# Patient Record
Sex: FEMALE | Race: WHITE | NOT HISPANIC OR LATINO | Employment: FULL TIME | URBAN - METROPOLITAN AREA
[De-identification: names, ages, dates, MRNs, and addresses within clinical notes are randomized per-mention and may not be internally consistent; named-entity substitution may affect disease eponyms.]

---

## 2017-03-28 ENCOUNTER — PATIENT MESSAGE (OUTPATIENT)
Dept: FAMILY MEDICINE | Facility: CLINIC | Age: 37
End: 2017-03-28

## 2017-04-21 ENCOUNTER — OFFICE VISIT (OUTPATIENT)
Dept: UROLOGY | Facility: CLINIC | Age: 37
End: 2017-04-21
Payer: MEDICAID

## 2017-04-21 VITALS
HEIGHT: 72 IN | DIASTOLIC BLOOD PRESSURE: 70 MMHG | BODY MASS INDEX: 25.53 KG/M2 | SYSTOLIC BLOOD PRESSURE: 116 MMHG | HEART RATE: 104 BPM | WEIGHT: 188.5 LBS

## 2017-04-21 DIAGNOSIS — N39.0 RECURRENT UTI: ICD-10-CM

## 2017-04-21 DIAGNOSIS — R31.9 HEMATURIA: Primary | ICD-10-CM

## 2017-04-21 LAB
BILIRUB SERPL-MCNC: ABNORMAL MG/DL
BLOOD URINE, POC: ABNORMAL
COLOR, POC UA: YELLOW
GLUCOSE UR QL STRIP: ABNORMAL
KETONES UR QL STRIP: ABNORMAL
LEUKOCYTE ESTERASE URINE, POC: ABNORMAL
NITRITE, POC UA: ABNORMAL
PH, POC UA: 5
PROTEIN, POC: ABNORMAL
SPECIFIC GRAVITY, POC UA: 1.01
UROBILINOGEN, POC UA: ABNORMAL

## 2017-04-21 PROCEDURE — 99204 OFFICE O/P NEW MOD 45 MIN: CPT | Mod: S$PBB,,, | Performed by: UROLOGY

## 2017-04-21 PROCEDURE — 99213 OFFICE O/P EST LOW 20 MIN: CPT | Mod: PBBFAC,PO | Performed by: UROLOGY

## 2017-04-21 PROCEDURE — 81002 URINALYSIS NONAUTO W/O SCOPE: CPT | Mod: PBBFAC,PO | Performed by: UROLOGY

## 2017-04-21 PROCEDURE — 99999 PR PBB SHADOW E&M-EST. PATIENT-LVL III: CPT | Mod: PBBFAC,,, | Performed by: UROLOGY

## 2017-04-21 NOTE — PROGRESS NOTES
A 37-year-old female accompanied by her  complaining of recurrent UTIs   for the last two years.  She had them during her early infancy and then they   tended to be less frequent and it got worse during the teenage years.  After   having her children in 2006 and 2008, she got better for a while and now finds   that they are coming back.  She tends to have approximately three infections per   year.  When she has a UTI, her symptoms are urinary frequency, urgency and   burning feeling at the end of the voiding.  She has no fever with her UTIs and   has no renal back pain.  She uses usually AZO and antibiotics are often Cipro or   Bactrim.  She does well with the antibiotic, but then tends to recur.  She was   recently in the ER at [facility], had bloody urine when catheterized and   it was questionable whether she had just a UTI or actually a stone.  Her [facility]   lab history has one urine culture from a year ago, which was no growth.    PAST MEDICAL HISTORY:  SURGICAL:  Vein ligation and stripping in lower extremities.  MEDICAL:  Varicose veins, gout, depression and anxiety.  FAMILIAL:  Father had hypertension.  Mother had osteoporosis and recurrent UTIs.    The patient has a brother with nephrolithiasis.  SOCIAL:  The patient is a , , lives in [place].    ALLERGIES:  Hydrocodone, which caused her itching.    PHYSICAL EXAMINATION:  ABDOMEN:  Flat.  No masses.  CVA is negative.  No organomegaly or tenderness.    IMPRESSION:  Recurrent UTIs, history of early UTIs in life.  The patient says   she was told she might have interstitial cystitis.    I recommend cystoscopy, bladder scan her, urine cultures as needed, image   evaluation of the upper tracts to study her UTI recurrence.  Eventually if she   is more convincingly thought of having interstitial cystitis, might have   hydrodistention of the bladder under anesthesia.  She can also be on antibiotic   prophylaxis if needed and  eventually could be considered for interstitial   cystitis medical treatment if appropriate.      ERR/TN  dd: 04/24/2017 18:44:33 (CDT)  td: 04/25/2017 13:19:25 (CDT)  Doc ID   #[id number]  Job ID #[id number]    CC:

## 2017-05-09 ENCOUNTER — PROCEDURE VISIT (OUTPATIENT)
Dept: UROLOGY | Facility: CLINIC | Age: 37
End: 2017-05-09
Payer: MEDICAID

## 2017-05-09 VITALS
DIASTOLIC BLOOD PRESSURE: 84 MMHG | SYSTOLIC BLOOD PRESSURE: 115 MMHG | WEIGHT: 187.38 LBS | HEIGHT: 72 IN | BODY MASS INDEX: 25.38 KG/M2 | HEART RATE: 58 BPM

## 2017-05-09 DIAGNOSIS — R31.9 HEMATURIA: ICD-10-CM

## 2017-05-09 DIAGNOSIS — N30.10 IC (INTERSTITIAL CYSTITIS): Primary | ICD-10-CM

## 2017-05-09 DIAGNOSIS — N30.00 ACUTE CYSTITIS WITHOUT HEMATURIA: ICD-10-CM

## 2017-05-09 LAB
BILIRUB SERPL-MCNC: ABNORMAL MG/DL
BLOOD URINE, POC: ABNORMAL
COLOR, POC UA: YELLOW
GLUCOSE UR QL STRIP: ABNORMAL
KETONES UR QL STRIP: ABNORMAL
LEUKOCYTE ESTERASE URINE, POC: ABNORMAL
NITRITE, POC UA: ABNORMAL
PH, POC UA: 5
PROTEIN, POC: ABNORMAL
SPECIFIC GRAVITY, POC UA: 1
UROBILINOGEN, POC UA: ABNORMAL

## 2017-05-09 PROCEDURE — 52000 CYSTOURETHROSCOPY: CPT | Mod: S$PBB,,, | Performed by: UROLOGY

## 2017-05-09 PROCEDURE — 81002 URINALYSIS NONAUTO W/O SCOPE: CPT | Mod: PBBFAC,PO | Performed by: UROLOGY

## 2017-05-09 PROCEDURE — 52000 CYSTOURETHROSCOPY: CPT | Mod: PBBFAC,PO | Performed by: UROLOGY

## 2017-05-09 RX ORDER — SULFAMETHOXAZOLE AND TRIMETHOPRIM 800; 160 MG/1; MG/1
1 TABLET ORAL 2 TIMES DAILY
Qty: 30 TABLET | Refills: 3 | Status: SHIPPED | OUTPATIENT
Start: 2017-05-09 | End: 2017-06-08

## 2017-05-09 NOTE — PROGRESS NOTES
UROLOGY [place]  5 9 17    c-c recurrent uti's; bladder pain    Age 37, here for cystoscopy.    CYSTOSCOPY [facility] flexible. Urethra normal, with no stricture or diverticulum. Bladder cavity distends symmetrically and equally when distended with water. Mucosal layer with no lesions. No petechiae seen on the bladder wall. No abnormal trabeculation. Location and shape of the ureteral orifices normal. Pt tolerated the procedure well.     IMP  Recurrent uti's  Chronic bladder irritation    Pt has a hx of recurrent uti's. When questioned regarding possible relation with sexual intercourse, pt does not thing that there is any definite connection between intercourse and subsequent uti's  We discussed hygienic measures.   Pt has what appear to be recurrent uti's; we discussed possible use of preventive antibiotics, and we discussed various ways to use them for that purpose.  We decided to put pt on a patient-initiating program of episodic antibiotic use, where pt would take bactrim bid x 3 days at the first sign of a uti.  She would then let us know if the approach is working for her.    In addition to the recurrent uti's, pt appears to have a chronic state of bladder irritation.  The features of her bladder problems places her in the category of interstitial cystitis (low volume voidings, chronic bladder pain, partial and temporary relief of the bladder pain after voiding).  I discussed approaches to this chronic condition, and I suggest treatment triad with elmiron 100 mg po bid, the antihistaminic atarax 50 bid, and the antidepressant elavil 50 qhs.   For now, I am starting with the elmiron only and will later add the other two as needed.  Can also start using prelief before every meal.  Can also use pyridium 20 tid with a meal as needed for dysuria  Can also use uribel as needed.  Eventually we can use vaginal suppositories with lidocaine 20 mg, baclofen 6 mg and diazepam 5 mg (compounded)  RTC 6 mo

## 2017-05-10 ENCOUNTER — PATIENT MESSAGE (OUTPATIENT)
Dept: UROLOGY | Facility: CLINIC | Age: 37
End: 2017-05-10

## 2017-05-11 ENCOUNTER — PATIENT MESSAGE (OUTPATIENT)
Dept: UROLOGY | Facility: CLINIC | Age: 37
End: 2017-05-11

## 2017-06-09 ENCOUNTER — OFFICE VISIT (OUTPATIENT)
Dept: FAMILY MEDICINE | Facility: CLINIC | Age: 37
End: 2017-06-09
Payer: MEDICAID

## 2017-06-09 VITALS
DIASTOLIC BLOOD PRESSURE: 88 MMHG | TEMPERATURE: 99 F | HEIGHT: 72 IN | BODY MASS INDEX: 23.89 KG/M2 | OXYGEN SATURATION: 97 % | HEART RATE: 92 BPM | RESPIRATION RATE: 18 BRPM | SYSTOLIC BLOOD PRESSURE: 118 MMHG | WEIGHT: 176.38 LBS

## 2017-06-09 DIAGNOSIS — R61 NIGHT SWEATS: ICD-10-CM

## 2017-06-09 DIAGNOSIS — F32.A DEPRESSION, UNSPECIFIED DEPRESSION TYPE: Primary | ICD-10-CM

## 2017-06-09 PROCEDURE — 99214 OFFICE O/P EST MOD 30 MIN: CPT | Mod: S$GLB,,, | Performed by: NURSE PRACTITIONER

## 2017-06-09 RX ORDER — FLUOXETINE HYDROCHLORIDE 20 MG/1
20 CAPSULE ORAL DAILY
Qty: 30 CAPSULE | Refills: 2 | Status: SHIPPED | OUTPATIENT
Start: 2017-06-09 | End: 2017-09-19 | Stop reason: SDUPTHER

## 2017-06-09 RX ORDER — ALPRAZOLAM 0.5 MG/1
0.5 TABLET ORAL 3 TIMES DAILY
COMMUNITY
End: 2017-06-14 | Stop reason: SDUPTHER

## 2017-06-09 RX ORDER — FERROUS GLUCONATE 324(38)MG
324 TABLET ORAL
COMMUNITY
End: 2017-09-29

## 2017-06-09 NOTE — PROGRESS NOTES
Subjective:       Patient ID: Nayeli Gore is a 37 y.o. female.    Chief Complaint: Fatigue x 2-3 months, check labs and Medication Management, discuss antidepressant    HPI Answers for HPI/ROS submitted by the patient on 6/7/2017   activity change: No  unexpected weight change: No  neck pain: Yes  hearing loss: No  rhinorrhea: No  trouble swallowing: No  eye discharge: No  visual disturbance: No  chest tightness: No  wheezing: No  chest pain: No  palpitations: Yes  blood in stool: No  constipation: No  vomiting: No  diarrhea: Yes  polydipsia: No  polyuria: Yes  difficulty urinating: No  hematuria: Yes  menstrual problem: No  dysuria: Yes  joint swelling: No  arthralgias: No  headaches: Yes  weakness: No  confusion: No  dysphoric mood: Yes    She is here with concerns regarding more symptoms of depression, night sweats. Not sleeping well. She feels like this could be hormone related. She would like to have blood drawn to check her thyroid and hormones. She denies any thoughts of wanting to harm herself or others. She feels at times that her heart is racing. She feels that the wellbutrin helps a little, but is not working as well as it needs too. See ROS.    The following portion of the patients history was reviewed and updated as appropriate: allergies, current medications, past medical and surgical history. Past social history and problem list reviewed. Family PMH and Past social history reviewed. Tobacco, Illicit drug use reviewed.     Review of Systems   Constitutional: Positive for fatigue. Negative for activity change, chills, fever and unexpected weight change.        Night sweats.    HENT: Negative for hearing loss, rhinorrhea and trouble swallowing.    Eyes: Negative for discharge and visual disturbance.   Respiratory: Negative for cough, chest tightness and wheezing.    Cardiovascular: Positive for palpitations. Negative for chest pain.   Gastrointestinal: Positive for diarrhea. Negative for blood  "in stool, constipation and vomiting.   Endocrine: Positive for polyuria. Negative for polydipsia.   Genitourinary: Positive for dysuria and hematuria. Negative for difficulty urinating and menstrual problem.   Musculoskeletal: Positive for neck pain. Negative for arthralgias and joint swelling.   Skin: Negative for rash.   Neurological: Positive for headaches. Negative for weakness.   Psychiatric/Behavioral: Positive for dysphoric mood. Negative for confusion.       Objective:     /88 (BP Location: Left arm, Patient Position: Sitting, BP Method: Manual)   Pulse 92   Temp 98.8 °F (37.1 °C) (Oral)   Resp 18   Ht 6' 2" (1.88 m)   Wt 80 kg (176 lb 5.9 oz)   SpO2 97%   BMI 22.64 kg/m²      Physical Exam     Constitutional: oriented to person, place, and time. well-developed and well-nourished.   HENT: normal  Head: Normocephalic.   Eyes: Conjunctivae are normal. Pupils are equal, round, and reactive to light.   Neck: Normal range of motion. Neck supple. No tracheal deviation present. No thyromegaly present.   Cardiovascular: Normal rate, regular rhythm and normal heart sounds.    Pulmonary/Chest: Effort normal and breath sounds normal. No respiratory distress. No wheezes.   Abdominal: Soft. Bowel sounds are normal. No distension. There is no tenderness.   Musculoskeletal: Normal range of motion. No edema.   Neurological: oriented to person, place, and time.   Skin: Skin is warm and dry. No rashes or lesions  Psychiatric: Normal mood and affect.Behavior is normal. Judgment and thought content normal. she does not present as a threat to herself or others  Assessment:       1. Depression, unspecified depression type    2. Night sweats        Plan:         Nayeli was seen today for fatigue x 2-3 months, check labs and medication management, discuss antidepressant.    Diagnoses and all orders for this visit:    Depression, unspecified depression type: will add prozac to her medications.   -     Estradiol  -     " Follicle stimulating hormone; Future  -     Progesterone  -     Testosterone; Future  -     TSH; Future    Night sweats:  Will check hormone levels.   -     Estradiol  -     Follicle stimulating hormone; Future  -     Progesterone  -     Testosterone; Future  -     TSH; Future    Other orders  -     fluoxetine (PROZAC) 20 MG capsule; Take 1 capsule (20 mg total) by mouth once daily.    Continue current medication  Take medications only as prescribed  Healthy diet, exercise  Adequate rest  Adequate hydration  Avoid allergens  Avoid excessive caffeine

## 2017-06-09 NOTE — PROGRESS NOTES
Answers for HPI/ROS submitted by the patient on 6/7/2017   activity change: No  unexpected weight change: No  neck pain: Yes  hearing loss: No  rhinorrhea: No  trouble swallowing: No  eye discharge: No  visual disturbance: No  chest tightness: No  wheezing: No  chest pain: No  palpitations: Yes  blood in stool: No  constipation: No  vomiting: No  diarrhea: Yes  polydipsia: No  polyuria: Yes  difficulty urinating: No  hematuria: Yes  menstrual problem: No  dysuria: Yes  joint swelling: No  arthralgias: No  headaches: Yes  weakness: No  confusion: No  dysphoric mood: Yes

## 2017-06-10 ENCOUNTER — LAB VISIT (OUTPATIENT)
Dept: LAB | Facility: HOSPITAL | Age: 37
End: 2017-06-10
Attending: NURSE PRACTITIONER
Payer: MEDICAID

## 2017-06-10 DIAGNOSIS — R61 NIGHT SWEATS: ICD-10-CM

## 2017-06-10 DIAGNOSIS — F32.A DEPRESSION, UNSPECIFIED DEPRESSION TYPE: ICD-10-CM

## 2017-06-10 LAB
FSH SERPL-ACNC: 3 MIU/ML
TESTOST SERPL-MCNC: 44 NG/DL
TSH SERPL DL<=0.005 MIU/L-ACNC: 0.73 UIU/ML

## 2017-06-10 PROCEDURE — 83001 ASSAY OF GONADOTROPIN (FSH): CPT

## 2017-06-10 PROCEDURE — 84403 ASSAY OF TOTAL TESTOSTERONE: CPT

## 2017-06-10 PROCEDURE — 36415 COLL VENOUS BLD VENIPUNCTURE: CPT | Mod: PO

## 2017-06-10 PROCEDURE — 84443 ASSAY THYROID STIM HORMONE: CPT

## 2017-06-11 ENCOUNTER — PATIENT MESSAGE (OUTPATIENT)
Dept: FAMILY MEDICINE | Facility: CLINIC | Age: 37
End: 2017-06-11

## 2017-06-11 ENCOUNTER — TELEPHONE (OUTPATIENT)
Dept: FAMILY MEDICINE | Facility: CLINIC | Age: 37
End: 2017-06-11

## 2017-06-11 DIAGNOSIS — R53.83 FATIGUE, UNSPECIFIED TYPE: Primary | ICD-10-CM

## 2017-06-11 NOTE — TELEPHONE ENCOUNTER
She was suppose to have estradiol and progesterone drawn with the labs we did in clinic on Friday. Shows they were not collected.  Why?  Let her know that these two were over looked and she needs to have them done. What was done the FSH, TSH and testosterone are all in normal range.

## 2017-06-13 ENCOUNTER — PATIENT MESSAGE (OUTPATIENT)
Dept: FAMILY MEDICINE | Facility: CLINIC | Age: 37
End: 2017-06-13

## 2017-06-14 RX ORDER — ALPRAZOLAM 0.5 MG/1
0.5 TABLET ORAL 3 TIMES DAILY PRN
Qty: 30 TABLET | Refills: 0 | Status: SHIPPED | OUTPATIENT
Start: 2017-06-14 | End: 2017-07-07 | Stop reason: SDUPTHER

## 2017-06-14 NOTE — TELEPHONE ENCOUNTER
Reviewed patients chart- it looks like the two that were not processed were entered as clinic collect and the rest as lab collect. Pt went to the lab to have labs drawn. Unsure if this was preplanned? I do not see that any orders were cancelled and reentered. She is okay repeating labs and asks if her iron level can be checked also. Orders pended.

## 2017-06-17 ENCOUNTER — LAB VISIT (OUTPATIENT)
Dept: LAB | Facility: HOSPITAL | Age: 37
End: 2017-06-17
Attending: NURSE PRACTITIONER
Payer: MEDICAID

## 2017-06-17 DIAGNOSIS — R53.83 FATIGUE, UNSPECIFIED TYPE: ICD-10-CM

## 2017-06-17 LAB
ESTRADIOL SERPL-MCNC: 113 PG/ML
FERRITIN SERPL-MCNC: 7 NG/ML
IRON SERPL-MCNC: 32 UG/DL
PROGEST SERPL-MCNC: 10 NG/ML
SATURATED IRON: 8 %
TOTAL IRON BINDING CAPACITY: 425 UG/DL
TRANSFERRIN SERPL-MCNC: 287 MG/DL

## 2017-06-17 PROCEDURE — 82728 ASSAY OF FERRITIN: CPT

## 2017-06-17 PROCEDURE — 36415 COLL VENOUS BLD VENIPUNCTURE: CPT | Mod: PO

## 2017-06-17 PROCEDURE — 83540 ASSAY OF IRON: CPT

## 2017-06-17 PROCEDURE — 82670 ASSAY OF TOTAL ESTRADIOL: CPT

## 2017-06-17 PROCEDURE — 84144 ASSAY OF PROGESTERONE: CPT

## 2017-06-19 ENCOUNTER — TELEPHONE (OUTPATIENT)
Dept: FAMILY MEDICINE | Facility: CLINIC | Age: 37
End: 2017-06-19

## 2017-06-19 DIAGNOSIS — D50.9 IRON DEFICIENCY ANEMIA, UNSPECIFIED: Primary | ICD-10-CM

## 2017-06-19 NOTE — TELEPHONE ENCOUNTER
Notified pt of results per provider. Pt stated verbal understanding. Please advise on appointment for pt.

## 2017-06-19 NOTE — TELEPHONE ENCOUNTER
She has a ferritin level of 7, a saturated iron level of 8. She needs to see hematology and get an iron infusion. I will put in referral.

## 2017-06-20 ENCOUNTER — PATIENT MESSAGE (OUTPATIENT)
Dept: FAMILY MEDICINE | Facility: CLINIC | Age: 37
End: 2017-06-20

## 2017-06-20 ENCOUNTER — TELEPHONE (OUTPATIENT)
Dept: HEMATOLOGY/ONCOLOGY | Facility: CLINIC | Age: 37
End: 2017-06-20

## 2017-06-20 DIAGNOSIS — D50.9 IRON DEFICIENCY ANEMIA, UNSPECIFIED IRON DEFICIENCY ANEMIA TYPE: Primary | ICD-10-CM

## 2017-06-20 NOTE — TELEPHONE ENCOUNTER
----- Message from Shelbi Sandoval sent at 6/20/2017  1:04 PM CDT -----  Contact: pt [phone]  Patient called and asked for a call back to set up an appointment she has a referral in the system.

## 2017-06-21 NOTE — TELEPHONE ENCOUNTER
Previous messages have been sent over on this patient. Please advise when she will be able to get scheduled.

## 2017-06-22 ENCOUNTER — PATIENT MESSAGE (OUTPATIENT)
Dept: FAMILY MEDICINE | Facility: CLINIC | Age: 37
End: 2017-06-22

## 2017-06-22 ENCOUNTER — PATIENT OUTREACH (OUTPATIENT)
Dept: ADMINISTRATIVE | Facility: HOSPITAL | Age: 37
End: 2017-06-22

## 2017-06-22 NOTE — TELEPHONE ENCOUNTER
See message thread, patient has not been scheduled for hem/onc. Requesting a referral for Dr. Owusu, please advise.

## 2017-07-07 PROBLEM — D50.0 IRON DEFICIENCY ANEMIA DUE TO CHRONIC BLOOD LOSS: Status: ACTIVE | Noted: 2017-07-07

## 2017-07-07 RX ORDER — ALPRAZOLAM 0.5 MG/1
0.5 TABLET ORAL 3 TIMES DAILY PRN
Qty: 30 TABLET | Refills: 0 | Status: SHIPPED | OUTPATIENT
Start: 2017-07-07 | End: 2017-08-10 | Stop reason: SDUPTHER

## 2017-08-11 ENCOUNTER — PATIENT MESSAGE (OUTPATIENT)
Dept: FAMILY MEDICINE | Facility: CLINIC | Age: 37
End: 2017-08-11

## 2017-08-11 RX ORDER — ALPRAZOLAM 0.5 MG/1
0.5 TABLET ORAL 3 TIMES DAILY PRN
Qty: 30 TABLET | Refills: 0 | Status: SHIPPED | OUTPATIENT
Start: 2017-08-11 | End: 2018-08-06

## 2017-08-11 NOTE — TELEPHONE ENCOUNTER
This will be the last refill I can give her on the Xanax. If she feels she needs this medication on a monthly basis she will need to see Psychiatry.

## 2017-08-23 ENCOUNTER — PATIENT MESSAGE (OUTPATIENT)
Dept: FAMILY MEDICINE | Facility: CLINIC | Age: 37
End: 2017-08-23

## 2017-09-18 ENCOUNTER — PATIENT MESSAGE (OUTPATIENT)
Dept: FAMILY MEDICINE | Facility: CLINIC | Age: 37
End: 2017-09-18

## 2017-09-18 RX ORDER — BUPROPION HYDROCHLORIDE 150 MG/1
150 TABLET, EXTENDED RELEASE ORAL 2 TIMES DAILY
Qty: 60 TABLET | Refills: 11 | Status: SHIPPED | OUTPATIENT
Start: 2017-09-18 | End: 2018-09-18

## 2017-09-20 RX ORDER — FLUOXETINE HYDROCHLORIDE 20 MG/1
CAPSULE ORAL
Qty: 30 CAPSULE | Refills: 2 | Status: SHIPPED | OUTPATIENT
Start: 2017-09-20 | End: 2017-12-19 | Stop reason: SDUPTHER

## 2017-10-01 ENCOUNTER — HOSPITAL ENCOUNTER (EMERGENCY)
Facility: HOSPITAL | Age: 37
Discharge: HOME | End: 2017-10-01
Attending: EMERGENCY MEDICINE
Payer: MEDICAID

## 2017-10-01 VITALS
OXYGEN SATURATION: 99 % | WEIGHT: 165 LBS | DIASTOLIC BLOOD PRESSURE: 60 MMHG | HEIGHT: 72 IN | RESPIRATION RATE: 19 BRPM | HEART RATE: 83 BPM | SYSTOLIC BLOOD PRESSURE: 101 MMHG | TEMPERATURE: 98 F | BODY MASS INDEX: 22.35 KG/M2
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
## 2017-10-01 DIAGNOSIS — S93.401A SPRAIN OF RIGHT ANKLE, UNSPECIFIED LIGAMENT, INITIAL ENCOUNTER: Primary | ICD-10-CM

## 2017-10-01 DIAGNOSIS — M25.571 RIGHT ANKLE PAIN: ICD-10-CM

## 2017-10-01 LAB
B-HCG UR QL: NEGATIVE
CTP QC/QA: YES

## 2017-10-01 PROCEDURE — 99284 EMERGENCY DEPT VISIT MOD MDM: CPT | Mod: 25

## 2017-10-01 PROCEDURE — 25000003 PHARM REV CODE 250: Performed by: EMERGENCY MEDICINE

## 2017-10-01 PROCEDURE — 99284 EMERGENCY DEPT VISIT MOD MDM: CPT | Mod: ,,, | Performed by: EMERGENCY MEDICINE

## 2017-10-01 PROCEDURE — 81025 URINE PREGNANCY TEST: CPT | Performed by: EMERGENCY MEDICINE

## 2017-10-01 RX ORDER — HYDROCODONE BITARTRATE AND ACETAMINOPHEN 5; 325 MG/1; MG/1
1 TABLET ORAL EVERY 6 HOURS PRN
Qty: 5 TABLET | Refills: 0 | Status: ON HOLD | OUTPATIENT
Start: 2017-10-01 | End: 2017-10-04 | Stop reason: HOSPADM

## 2017-10-01 RX ORDER — HYDROCODONE BITARTRATE AND ACETAMINOPHEN 5; 325 MG/1; MG/1
1 TABLET ORAL
Status: COMPLETED | OUTPATIENT
Start: 2017-10-01 | End: 2017-10-01

## 2017-10-01 RX ADMIN — HYDROCODONE BITARTRATE AND ACETAMINOPHEN 1 TABLET: 5; 325 TABLET ORAL at 12:10

## 2017-10-01 NOTE — ED TRIAGE NOTES
Right ankle pain 7/10 and swelling after falling off curb around 11pm. Denies loc, n/v.      LOC: The patient is awake, alert, aware of environment with an appropriate affect. Oriented x4, speaking appropriately  APPEARANCE: Pt resting comfortably, in no acute distress, pt is clean and well groomed, clothing properly fastened  SKIN:The skin is warm and dry, color consistent with ethnicity, patient has normal skin turgor and moist mucus membranes  RESPIRATORY:Airway is open and patent, respirations are spontaneous, patient has a normal effort and rate, no accessory muscle use noted.  CARDIAC: Normal rate and rhythm, no peripheral edema noted, capillary refill < 3 seconds, bilateral radial pulses 2+.  NEUROLOGIC: PERRLA, facial expression is symmetrical, patient moving all extremities spontaneously, normal sensation in all extremities when touched with a finger.  Follows all commands appropriately  MUSCULOSKELETAL: Patient moving all extremities spontaneously,right ankle swelling

## 2017-10-01 NOTE — ED PROVIDER NOTES
Encounter Date: 10/1/2017    SCRIBE #1 NOTE: I, Dulce Hogan, am scribing for, and in the presence of,  Dr. Velarde. I have scribed the following portions of the note - the Resident attestation.       History     Chief Complaint   Patient presents with    Ankle Pain     Pt reports falling off curb at outside of bar and hurting right ankle. Obvious swelling noted to right ankle.     HPI     37-year-old woman presents with right ankle pain, new, brought on by twisting her ankle, severe, constant, no medications tried for relief, associated with ankle swelling.  Unable to bear weight on scene and in the ED.  Did not fall, did not hit her head, denies pain in any other location.    Review of patient's allergies indicates:   Allergen Reactions    Codeine     Hydrocodone Itching     Past Medical History:   Diagnosis Date    Anxiety     Depression     Encounter for blood transfusion 04/13/2006    after birth of first child    Gout     Varicose veins      Past Surgical History:   Procedure Laterality Date    leg laser      for venous reflux    VEIN LIGATION AND STRIPPING Left 2012    left leg     Family History   Problem Relation Age of Onset    Osteoporosis Mother     Other Mother      recurrent uti's    Hypertension Father     Lupus Maternal Grandmother     Cancer Maternal Grandfather      lung    No Known Problems Son     Lupus Paternal Grandmother     Dementia Paternal Grandmother     No Known Problems Son     Nephrolithiasis Brother      Social History   Substance Use Topics    Smoking status: Former Smoker     Packs/day: 0.50     Years: 8.00     Quit date: 10/24/2015    Smokeless tobacco: Never Used    Alcohol use 0.0 oz/week      Comment: very rare     Review of Systems   Constitutional: Negative for diaphoresis and fever.   HENT: Negative for drooling and facial swelling.    Eyes: Negative for discharge and redness.   Respiratory: Negative for shortness of breath and stridor.     Cardiovascular: Negative for chest pain and leg swelling.   Gastrointestinal: Negative for abdominal pain, nausea and vomiting.   Genitourinary: Negative for dysuria and hematuria.   Musculoskeletal: Positive for arthralgias and joint swelling. Negative for neck stiffness.   Skin: Negative for rash and wound.   Neurological: Negative for facial asymmetry and speech difficulty.   Psychiatric/Behavioral: Negative for agitation and confusion.       Physical Exam     Initial Vitals [10/01/17 0015]   BP Pulse Resp Temp SpO2   119/85 90 16 97.7 °F (36.5 °C) 95 %      MAP       96.33         Physical Exam    Nursing note and vitals reviewed.  Constitutional: She appears well-developed and well-nourished. She is not diaphoretic. No distress.   HENT:   Head: Normocephalic and atraumatic.   Right Ear: External ear normal.   Left Ear: External ear normal.   Nose: Nose normal.   Mouth/Throat: Oropharynx is clear and moist.   Eyes: Conjunctivae are normal. Pupils are equal, round, and reactive to light. Right eye exhibits no discharge. Left eye exhibits no discharge. No scleral icterus.   Neck: Neck supple. No thyromegaly present. No tracheal deviation present. No JVD present.   Cardiovascular: Normal rate, regular rhythm, normal heart sounds and intact distal pulses. Exam reveals no gallop and no friction rub.    No murmur heard.  Pulses:       Dorsalis pedis pulses are 2+ on the right side, and 2+ on the left side.   Pulmonary/Chest: Breath sounds normal. No stridor. No respiratory distress. She has no wheezes. She has no rhonchi. She has no rales. She exhibits no tenderness.   Abdominal: Soft. Bowel sounds are normal. She exhibits no distension. There is no tenderness. There is no rebound and no guarding.   Musculoskeletal: She exhibits no edema.        Right ankle: She exhibits decreased range of motion and swelling. She exhibits normal pulse. Tenderness. Lateral malleolus tenderness found. No medial malleolus, no head of  5th metatarsal and no proximal fibula tenderness found.   Lymphadenopathy:     She has no cervical adenopathy.   Neurological: She is alert and oriented to person, place, and time.   Foot sensation intact.   Skin: Skin is warm and dry.   Psychiatric: She has a normal mood and affect. Her behavior is normal.         ED Course   Procedures  Labs Reviewed   POCT URINE PREGNANCY          HO-III MDM:  Ddx: Sprain, strain, fracture, dislocation.    Needed.  Norco given for pain.  X-ray with no fracture.    Crutches and Aircast.  Analgesia with primary care follow-up.    Jean Carlos Ledbetter, PGY3 1:20 AM 10/02/2017         Medical Decision Making:   History:   Old Medical Records: I decided to obtain old medical records.  Clinical Tests:   Lab Tests: Reviewed and Ordered  Radiological Study: Reviewed and Ordered            Scribe Attestation:   Scribe #1: I performed the above scribed service and the documentation accurately describes the services I performed. I attest to the accuracy of the note.    Attending Attestation:   Physician Attestation Statement for Resident:  As the supervising MD   Physician Attestation Statement: I have personally seen and examined this patient.   I agree with the above history. -: 37 y.o. female presents with ankle pain. XR showed no evidence of fracture. Pt put in air splint and discharged with crutches.   As the supervising MD I agree with the above PE.    As the supervising MD I agree with the above treatment, course, plan, and disposition.          Physician Attestation for Scribe:      Comments: I, Dr. Olga Velarde, personally performed the services described in this documentation. All medical record entries made by the scribe were at my direction and in my presence.  I have reviewed the chart and agree that the record reflects my personal performance and is accurate and complete. Olga Velarde MD.  5:13 AM 10/02/2017              ED Course      Clinical Impression:   The primary  encounter diagnosis was Sprain of right ankle, unspecified ligament, initial encounter. A diagnosis of Right ankle pain was also pertinent to this visit.    Disposition:   Disposition: Discharged  Condition: Stable                        Jean Carlos Ledbetter MD  Resident  10/01/17 0504       Olga Velarde MD  10/02/17 0514

## 2017-10-04 PROBLEM — R44.9 FOCAL SENSORY LOSS: Status: ACTIVE | Noted: 2017-10-04

## 2017-10-04 PROBLEM — N92.1 MENOMETRORRHAGIA: Status: ACTIVE | Noted: 2017-10-04

## 2017-12-19 RX ORDER — FLUOXETINE HYDROCHLORIDE 20 MG/1
CAPSULE ORAL
Qty: 30 CAPSULE | Refills: 2 | Status: SHIPPED | OUTPATIENT
Start: 2017-12-19 | End: 2018-03-19 | Stop reason: SDUPTHER

## 2018-02-09 ENCOUNTER — OFFICE VISIT (OUTPATIENT)
Dept: FAMILY MEDICINE | Facility: CLINIC | Age: 38
End: 2018-02-09
Payer: MEDICAID

## 2018-02-09 VITALS
HEIGHT: 72 IN | RESPIRATION RATE: 16 BRPM | WEIGHT: 174.63 LBS | DIASTOLIC BLOOD PRESSURE: 60 MMHG | SYSTOLIC BLOOD PRESSURE: 118 MMHG | HEART RATE: 78 BPM | BODY MASS INDEX: 23.65 KG/M2 | TEMPERATURE: 99 F

## 2018-02-09 DIAGNOSIS — R41.840 CONCENTRATION DEFICIT: ICD-10-CM

## 2018-02-09 DIAGNOSIS — F41.9 ANXIETY: Primary | ICD-10-CM

## 2018-02-09 PROCEDURE — 3008F BODY MASS INDEX DOCD: CPT | Mod: S$GLB,,, | Performed by: NURSE PRACTITIONER

## 2018-02-09 PROCEDURE — 99214 OFFICE O/P EST MOD 30 MIN: CPT | Mod: S$GLB,,, | Performed by: NURSE PRACTITIONER

## 2018-02-09 NOTE — PATIENT INSTRUCTIONS
[facility]  [phone]    [facility]  [address], [place] [address]  Phone: [phone]    [facility]  [address]  [place] [address]  Phone: [phone]  Fax: [phone]    [address]  [address]  [place] [address]  Tel: [phone]   Fax: [phone]    [facility]  [address] [place] [address]  Telephone: [phone]    [place] [address] [place] [address]  Phone: [phone]  Fax: [phone]

## 2018-02-13 NOTE — PROGRESS NOTES
"Subjective:       Patient ID: Nayeli Gore is a 37 y.o. female.    Chief Complaint: Medication Refill    The patient is here for a follow-up visit.  She tells me she does have significant anxiety and would like to review her medications.  She is currently on Prozac as well as Wellbutrin and she is tolerating the medications well without any side effects.  She does think that her anxiety is that her but more importantly she thinks that she may have attention deficit disorder.  She feels like she is unable to concentrate or complete tasks.  She was recently demoted from her position at her job because she was doing sloppy work.  She has never been diagnosed with attention deficit disorder.      Medication Refill   Pertinent negatives include no abdominal pain, chills, fever or headaches.     Review of Systems   Constitutional: Negative for appetite change, chills and fever.   HENT: Negative for ear pain and postnasal drip.    Eyes: Negative for pain and itching.   Respiratory: Negative for chest tightness and shortness of breath.    Gastrointestinal: Negative for abdominal distention and abdominal pain.   Endocrine: Negative for polydipsia and polyuria.   Genitourinary: Negative for difficulty urinating and flank pain.   Skin: Negative for color change and pallor.   Neurological: Negative for light-headedness and headaches.   Hematological: Negative for adenopathy. Does not bruise/bleed easily.   Psychiatric/Behavioral: Positive for decreased concentration. Negative for agitation. The patient is nervous/anxious.        Objective:       Vitals:    02/09/18 1404   BP: 118/60   Pulse: 78   Resp: 16   Temp: 98.8 °F (37.1 °C)   TempSrc: Oral   Weight: 79.2 kg (174 lb 9.7 oz)   Height: 6' 2" (1.88 m)   PainSc: 0-No pain       Physical Exam   Constitutional: She is oriented to person, place, and time. She appears well-developed and well-nourished.   HENT:   Head: Normocephalic and atraumatic.   Right Ear: External " ear normal.   Left Ear: External ear normal.   Nose: Nose normal.   Mouth/Throat: Oropharynx is clear and moist.   Eyes: Conjunctivae are normal. Right eye exhibits no discharge. Left eye exhibits no discharge. No scleral icterus.   Neck: Normal range of motion. Neck supple. No tracheal deviation present.   Cardiovascular: Normal rate, regular rhythm and normal heart sounds.  Exam reveals no friction rub.    No murmur heard.  Pulmonary/Chest: Effort normal and breath sounds normal. No stridor. No respiratory distress. She has no wheezes. She has no rales. She exhibits no tenderness.   Musculoskeletal: Normal range of motion.   Lymphadenopathy:     She has no cervical adenopathy.   Neurological: She is alert and oriented to person, place, and time.   Skin: Skin is warm and dry.   Psychiatric: She has a normal mood and affect.       Assessment:       1. Anxiety    2. Concentration deficit        Plan:       Nayeli was seen today for medication refill.    Diagnoses and all orders for this visit:    Anxiety  Continue Prozac as well as Wellbutrin.    Concentration deficit  -     Ambulatory consult to Psychiatry    Greater than 50% of the patient's 40 minute clinic visit was spent on discussion about evaluation for attention deficit disorder.  We did have a long discussion about stimulant medications (abuse potential, dependence).  We discussed that she would need to be evaluated by psychiatry.

## 2018-03-19 RX ORDER — FLUOXETINE HYDROCHLORIDE 20 MG/1
CAPSULE ORAL
Qty: 30 CAPSULE | Refills: 2 | Status: SHIPPED | OUTPATIENT
Start: 2018-03-19 | End: 2018-04-24 | Stop reason: SDUPTHER

## 2018-04-24 ENCOUNTER — PATIENT MESSAGE (OUTPATIENT)
Dept: FAMILY MEDICINE | Facility: CLINIC | Age: 38
End: 2018-04-24

## 2018-04-25 RX ORDER — FLUOXETINE HYDROCHLORIDE 40 MG/1
40 CAPSULE ORAL DAILY
Qty: 90 CAPSULE | Refills: 3 | Status: SHIPPED | OUTPATIENT
Start: 2018-04-25 | End: 2018-08-06

## 2018-07-09 ENCOUNTER — TELEPHONE (OUTPATIENT)
Dept: VASCULAR SURGERY | Facility: CLINIC | Age: 38
End: 2018-07-09

## 2018-07-09 NOTE — TELEPHONE ENCOUNTER
----- Message from Lindsay Whitehead sent at 7/9/2018  3:35 PM CDT -----  Type: Needs Medical Advice    Who Called:  patient  Symptoms (please be specific): rt leg varicose  How long has patient had these symptoms:  [facility]  Pharmacy name and phone #: [facility]  Best Call Back Number: [phone] (home)     Additional Information: Stating previously seen for left leg varicose now issue with right, would like to schedule an appt

## 2018-07-23 ENCOUNTER — PATIENT MESSAGE (OUTPATIENT)
Dept: VASCULAR SURGERY | Facility: CLINIC | Age: 38
End: 2018-07-23

## 2018-07-26 DIAGNOSIS — R60.0 LOCALIZED EDEMA: Primary | ICD-10-CM

## 2018-08-06 ENCOUNTER — OFFICE VISIT (OUTPATIENT)
Dept: ORTHOPEDICS | Facility: CLINIC | Age: 38
End: 2018-08-06
Payer: COMMERCIAL

## 2018-08-06 ENCOUNTER — HOSPITAL ENCOUNTER (OUTPATIENT)
Dept: RADIOLOGY | Facility: HOSPITAL | Age: 38
Discharge: HOME | End: 2018-08-06
Attending: ORTHOPAEDIC SURGERY
Payer: COMMERCIAL

## 2018-08-06 VITALS — WEIGHT: 165 LBS | HEIGHT: 72 IN | BODY MASS INDEX: 22.35 KG/M2

## 2018-08-06 DIAGNOSIS — M54.10 RADICULOPATHY OF ARM: Primary | ICD-10-CM

## 2018-08-06 DIAGNOSIS — M54.2 NECK PAIN: ICD-10-CM

## 2018-08-06 DIAGNOSIS — M54.2 NECK PAIN: Primary | ICD-10-CM

## 2018-08-06 DIAGNOSIS — M25.512 ACUTE PAIN OF LEFT SHOULDER: ICD-10-CM

## 2018-08-06 PROCEDURE — 99203 OFFICE O/P NEW LOW 30 MIN: CPT | Mod: S$GLB,,, | Performed by: ORTHOPAEDIC SURGERY

## 2018-08-06 PROCEDURE — 3008F BODY MASS INDEX DOCD: CPT | Mod: CPTII,S$GLB,, | Performed by: ORTHOPAEDIC SURGERY

## 2018-08-06 PROCEDURE — 72050 X-RAY EXAM NECK SPINE 4/5VWS: CPT | Mod: TC,PO

## 2018-08-06 PROCEDURE — 99999 PR PBB SHADOW E&M-EST. PATIENT-LVL III: CPT | Mod: PBBFAC,,, | Performed by: ORTHOPAEDIC SURGERY

## 2018-08-06 PROCEDURE — 72050 X-RAY EXAM NECK SPINE 4/5VWS: CPT | Mod: 26,,, | Performed by: RADIOLOGY

## 2018-08-06 RX ORDER — METHYLPREDNISOLONE 4 MG/1
TABLET ORAL
Qty: 1 PACKAGE | Refills: 1 | Status: SHIPPED | OUTPATIENT
Start: 2018-08-06 | End: 2018-08-27

## 2018-08-06 RX ORDER — SULFACETAMIDE SODIUM AND SULFUR 9; 4.5 MG/G; MG/G
RINSE TOPICAL
Refills: 5 | COMMUNITY
Start: 2018-06-20 | End: 2020-06-24
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
## 2018-08-09 ENCOUNTER — OFFICE VISIT (OUTPATIENT)
Dept: ORTHOPEDICS | Facility: CLINIC | Age: 38
End: 2018-08-09
Payer: COMMERCIAL

## 2018-08-09 ENCOUNTER — TELEPHONE (OUTPATIENT)
Dept: ORTHOPEDICS | Facility: CLINIC | Age: 38
End: 2018-08-09

## 2018-08-09 ENCOUNTER — PATIENT MESSAGE (OUTPATIENT)
Dept: ORTHOPEDICS | Facility: CLINIC | Age: 38
End: 2018-08-09

## 2018-08-09 VITALS — HEIGHT: 72 IN | BODY MASS INDEX: 22.35 KG/M2 | WEIGHT: 165 LBS

## 2018-08-09 DIAGNOSIS — M54.12 CERVICAL RADICULOPATHY: ICD-10-CM

## 2018-08-09 DIAGNOSIS — M54.2 NECK PAIN: Primary | ICD-10-CM

## 2018-08-09 DIAGNOSIS — M54.10 RADICULOPATHY OF ARM: ICD-10-CM

## 2018-08-09 PROCEDURE — 99214 OFFICE O/P EST MOD 30 MIN: CPT | Mod: S$GLB,,, | Performed by: ORTHOPAEDIC SURGERY

## 2018-08-09 PROCEDURE — 99999 PR PBB SHADOW E&M-EST. PATIENT-LVL III: CPT | Mod: PBBFAC,,, | Performed by: ORTHOPAEDIC SURGERY

## 2018-08-09 PROCEDURE — 3008F BODY MASS INDEX DOCD: CPT | Mod: CPTII,S$GLB,, | Performed by: ORTHOPAEDIC SURGERY

## 2018-08-09 NOTE — PROGRESS NOTES
"DATE: 8/9/2018  PATIENT: Nayeli Gore    Attending Physician: Jean Carlos Haro M.D.    HISTORY:  Nayeli Gore is a 38 y.o. female who returns for follow up evaluation of  her left upper extremity pain and weakness.  She did undergo an MRI which showed disc bulges and foraminal narrowing at C5-6 C6-7 on the left.  She did have a Medrol Dosepak.  She states that provided no significant improvement.  She has had difficulty sleeping.  Pain is reported at 9/10 today. She presents to discuss her MRI results further treatment recommendations.    PMH/PSH/FamHx/SocHx:  Reviewed and unchanged from prior visit    ROS:  Constitution: Negative for chills, fever, and sweats. Negative for unexplained weight loss.  HENT: Negative for headaches and blurry vision.   Cardiovascular: Negative for chest pain, irregular heartbeat, leg swelling and palpitations.   Respiratory: Negative for cough and shortness of breath.   Gastrointestinal: Negative for abdominal pain, heartburn, nausea and vomiting.   Genitourinary: Negative for bladder incontinence and dysuria.   Musculoskeletal: Negative for systemic arthritis, joint swelling, muscle weakness and myalgias.   Neurological: Negative for numbness.   Psychiatric/Behavioral: Negative for depression.   Endocrine: Negative for polyuria.   Hematologic/Lymphatic: Negative for bleeding disorders.  Skin: Negative for poor wound healing.       EXAM:  Ht 6' 2" (1.88 m)   Wt 74.8 kg (165 lb)   BMI 21.18 kg/m²   Nayeli Gore is a well developed, well nourished female in no acute distress. Physical examination of the left shoulder evaluated the following:     Inspection, palpation and ROM of the cervical spine  Disc compression testing bilaterally  Inspection for swelling, ecchymosis, erythema, deformity and atrophy  Tenderness to palpation of the soft tissue and bony structures  Active and passive range of motion  Sensation of the shoulder and upper extremity  Motor " strength in the deltoid, supraspinatus, internal rotators and external rotators  Impingement, apprehension, relocation and Speed's tests  Upper extremity vascular exam (skin temp,color, capillary refill)  Inspection for pseudomotor signs     Remarkable findings included:  Mild decreased range of motion of the cervical spine to left side bend.  Disc compression testing to the left reproduces her neck shoulder and arm pain.  Sensation is decreased on the ulnar forearm.  There is weakness to resisted triceps extension.  Reflexes are symmetric.  Shoulder range of motion is within normal limits.  Strength is 5/5 in the supraspinatus and external rotators.  No impingement sign on exam       IMAGING:   MRI is personally reviewed and compared to the radiologist's report.  Multilevel cervical spondylosis most notably at C5-6 and C6-7 noted.  There is moderate left narrowing also at C5-6 and C6-7.    ASSESSMENT:  Cervical radiculopathy left upper extremity    PLAN:  The implications of the patient's evolution of symptoms and findings were explained to the patient in detail.  As she has had no response to the Medrol Dosepak, I have referred her to the Pain Management for consideration of cervical epidural steroid injection.  Schedule next appointment with pain management.          This note was dictated using voice recognition software. Please excuse any grammatical or typographical errors.

## 2018-08-09 NOTE — TELEPHONE ENCOUNTER
Referral entered as discussed, MRI in for review.   Patient is having TRICEPS WEAKNESS. Please advise if she can be worked in for sooner consults or set up for  Cervical ISSA

## 2018-08-09 NOTE — TELEPHONE ENCOUNTER
Please get patient set up with cervical ISSA with spread to the left side, make sure she stops taking the Medrol Dosepak.  Have her follow up with us after the injection 2 weeks later.

## 2018-08-10 NOTE — TELEPHONE ENCOUNTER
Spoke with patient. Procedure scheduled for 8/29 with 2 week follow up. Pre-op instructions reviewed and sent to patient.

## 2018-08-13 RX ORDER — HYDROCODONE BITARTRATE AND ACETAMINOPHEN 5; 325 MG/1; MG/1
1 TABLET ORAL EVERY 12 HOURS PRN
Qty: 20 TABLET | Refills: 0 | Status: SHIPPED | OUTPATIENT
Start: 2018-08-13 | End: 2018-10-17

## 2018-08-14 ENCOUNTER — PATIENT MESSAGE (OUTPATIENT)
Dept: ORTHOPEDICS | Facility: CLINIC | Age: 38
End: 2018-08-14

## 2018-08-14 ENCOUNTER — TELEPHONE (OUTPATIENT)
Dept: ORTHOPEDICS | Facility: CLINIC | Age: 38
End: 2018-08-14

## 2018-08-14 NOTE — TELEPHONE ENCOUNTER
----- Message from Amy Mathis sent at 8/14/2018  1:13 PM CDT -----  Contact: Long/w [facility]  Darrion is requesting to speak with nurse regarding Pt recent refill for(HYDROcodone-acetaminophen (NORCO) 5-325 mg per tablet)   Long would like to know if it is possible to change the quantity and dosage on the medication  Please call pt to advise  Call back   Thanks    ..  [facility] [address] [place] [address] - [address]  [address]  [facility] [address]  Phone: [phone] Fax: [phone]

## 2018-08-15 ENCOUNTER — PATIENT MESSAGE (OUTPATIENT)
Dept: SURGERY | Facility: HOSPITAL | Age: 38
End: 2018-08-15

## 2018-08-15 NOTE — TELEPHONE ENCOUNTER
As she has already seen Dr. Lynn, she should also call his office to discuss her worsening symptoms with him as Dr. Lynn may want to move the injection up

## 2018-08-20 ENCOUNTER — TELEPHONE (OUTPATIENT)
Dept: SURGERY | Facility: HOSPITAL | Age: 38
End: 2018-08-20

## 2018-08-20 RX ORDER — IBUPROFEN 800 MG/1
800 TABLET ORAL 3 TIMES DAILY
COMMUNITY
End: 2018-10-17

## 2018-08-20 NOTE — TELEPHONE ENCOUNTER
"When doing pre op call, patient stated that she was prescribed a medrol dose pack for back pain. This was approximately 2 weeks ago, and she stated that she had" maybe taken one day's worth" of it, but stopped when she was able to get this procedure scheduled. Will this interfere with her procedure?    "

## 2018-08-21 DIAGNOSIS — M50.30 DDD (DEGENERATIVE DISC DISEASE), CERVICAL: Primary | ICD-10-CM

## 2018-08-22 ENCOUNTER — HOSPITAL ENCOUNTER (OUTPATIENT)
Dept: RADIOLOGY | Facility: HOSPITAL | Age: 38
Discharge: HOME | End: 2018-08-22
Attending: ANESTHESIOLOGY | Admitting: ANESTHESIOLOGY
Payer: COMMERCIAL

## 2018-08-22 ENCOUNTER — HOSPITAL ENCOUNTER (OUTPATIENT)
Facility: HOSPITAL | Age: 38
Discharge: HOME | End: 2018-08-22
Attending: ANESTHESIOLOGY | Admitting: ANESTHESIOLOGY
Payer: COMMERCIAL

## 2018-08-22 VITALS
WEIGHT: 164 LBS | SYSTOLIC BLOOD PRESSURE: 96 MMHG | RESPIRATION RATE: 16 BRPM | DIASTOLIC BLOOD PRESSURE: 58 MMHG | BODY MASS INDEX: 22.21 KG/M2 | HEART RATE: 68 BPM | TEMPERATURE: 98 F | OXYGEN SATURATION: 100 % | HEIGHT: 72 IN

## 2018-08-22 DIAGNOSIS — M54.12 CERVICAL RADICULOPATHY: Primary | ICD-10-CM

## 2018-08-22 DIAGNOSIS — M50.30 DDD (DEGENERATIVE DISC DISEASE), CERVICAL: ICD-10-CM

## 2018-08-22 PROCEDURE — 76000 FLUOROSCOPY <1 HR PHYS/QHP: CPT | Mod: TC,PO

## 2018-08-22 PROCEDURE — 63600175 PHARM REV CODE 636 W HCPCS: Mod: PO | Performed by: ANESTHESIOLOGY

## 2018-08-22 PROCEDURE — 62321 NJX INTERLAMINAR CRV/THRC: CPT | Mod: ,,, | Performed by: ANESTHESIOLOGY

## 2018-08-22 PROCEDURE — A4216 STERILE WATER/SALINE, 10 ML: HCPCS | Mod: PO | Performed by: ANESTHESIOLOGY

## 2018-08-22 PROCEDURE — 99152 MOD SED SAME PHYS/QHP 5/>YRS: CPT | Mod: ,,, | Performed by: ANESTHESIOLOGY

## 2018-08-22 PROCEDURE — 25000003 PHARM REV CODE 250: Mod: PO | Performed by: ANESTHESIOLOGY

## 2018-08-22 PROCEDURE — 25500020 PHARM REV CODE 255: Mod: PO | Performed by: ANESTHESIOLOGY

## 2018-08-22 PROCEDURE — 62321 NJX INTERLAMINAR CRV/THRC: CPT | Mod: PO | Performed by: ANESTHESIOLOGY

## 2018-08-22 RX ORDER — SODIUM CHLORIDE 9 MG/ML
INJECTION, SOLUTION INTRAMUSCULAR; INTRAVENOUS; SUBCUTANEOUS
Status: DISCONTINUED | OUTPATIENT
Start: 2018-08-22 | End: 2018-08-22 | Stop reason: HOSPADM

## 2018-08-22 RX ORDER — LIDOCAINE HYDROCHLORIDE 10 MG/ML
INJECTION, SOLUTION EPIDURAL; INFILTRATION; INTRACAUDAL; PERINEURAL
Status: DISCONTINUED | OUTPATIENT
Start: 2018-08-22 | End: 2018-08-22 | Stop reason: HOSPADM

## 2018-08-22 RX ORDER — METHYLPREDNISOLONE ACETATE 80 MG/ML
INJECTION, SUSPENSION INTRA-ARTICULAR; INTRALESIONAL; INTRAMUSCULAR; SOFT TISSUE
Status: DISCONTINUED | OUTPATIENT
Start: 2018-08-22 | End: 2018-08-22 | Stop reason: HOSPADM

## 2018-08-22 RX ORDER — SODIUM CHLORIDE, SODIUM LACTATE, POTASSIUM CHLORIDE, CALCIUM CHLORIDE 600; 310; 30; 20 MG/100ML; MG/100ML; MG/100ML; MG/100ML
INJECTION, SOLUTION INTRAVENOUS CONTINUOUS
Status: DISCONTINUED | OUTPATIENT
Start: 2018-08-29 | End: 2018-08-22 | Stop reason: HOSPADM

## 2018-08-22 RX ORDER — MIDAZOLAM HYDROCHLORIDE 1 MG/ML
INJECTION INTRAMUSCULAR; INTRAVENOUS
Status: DISCONTINUED | OUTPATIENT
Start: 2018-08-22 | End: 2018-08-22 | Stop reason: HOSPADM

## 2018-08-22 RX ADMIN — SODIUM CHLORIDE, SODIUM LACTATE, POTASSIUM CHLORIDE, AND CALCIUM CHLORIDE: .6; .31; .03; .02 INJECTION, SOLUTION INTRAVENOUS at 11:08

## 2018-08-22 NOTE — DISCHARGE INSTRUCTIONS
Recovery After Procedural Sedation (Adult)  You have been given medicine by vein to make you sleep during your surgery. This may have included both a pain medicine and sleeping medicine. Most of the effects have worn off. But you may still have some drowsiness for the next 6 to 8 hours.  Home care  Follow these guidelines when you get home:  · For the next 8 hours, you should be watched by a responsible adult. This person should make sure your condition is not getting worse.  · Don't drink any alcohol for the next 24 hours.  · Don't drive, operate dangerous machinery, or make important business or personal decisions during the next 24 hours.  Note: Your healthcare provider may tell you not to take any medicine by mouth for pain or sleep in the next 4 hours. These medicines may react with the medicines you were given in the hospital. This could cause a much stronger response than usual.  Follow-up care  Follow up with your healthcare provider if you are not alert and back to your usual level of activity within 12 hours.  When to seek medical advice  Call your healthcare provider right away if any of these occur:  · Drowsiness gets worse  · Weakness or dizziness gets worse  · Repeated vomiting  · You can't be awakened   Date Last Reviewed: 10/18/2016  © 3235-9769 [employer]. [address] [place] [address], [place] [address]. All rights reserved. This information is not intended as a substitute for professional medical care. Always follow your healthcare professional's instructions.          Home care instructions  Apply ice pack to the injection site for 20 minutes periods for the first 24 hrs for soreness/discomfort at injection site DO NOT USE HEAT FOR 24 HOURS  Keep site clean and dry for 24 hours  Do not drive until tomorrow  Take care when DOING ANY ACTIVITY AFTER HAVING YOUR CERVICAL NECK STEROID INJECTION  Avoid strenuous activities for 2 days  Make take 2 weeks to feel the full effects   Resume home  medication as prescribed today  Resume Aspirin, Plavix, or Coumadin the day after the procedure unless otherwise instructed.    SEE IMMEDIATE MEDICAL HELP FOR:  Severe increase in your usual pain or appearance of new pain  Prolonged or increasing weakness or numbness in the legs or arms  Drainage, redness, active bleeding, or increased swelling at the injection site  Temperature over 100.0 degrees F.  Headache that increases when your head is upright and decreases when you lie flat    CALL 911 OR GO DIRECTLY TO EMERGENCY DEPARTMENT FOR:  Shortness of breath, chest pain, or problems breathing

## 2018-08-22 NOTE — DISCHARGE SUMMARY
[facility]  Discharge Note  Short Stay    Admit Date: 8/22/2018    Discharge Date: 8/22/2018    Attending Physician: Valerio Lynn MD     Discharge Provider: Valerio Lynn    Diagnoses:  Active Hospital Problems    Diagnosis  POA    *Cervical radiculopathy [M54.12]  Yes      Resolved Hospital Problems   No resolved problems to display.       Discharged Condition: good    Final Diagnoses: Cervical radiculopathy [M54.12]    Disposition: Home or Self Care    Hospital Course: no complications, uneventful    Outcome of Hospitalization, Treatment, Procedure, or Surgery:  Patient was admitted for outpatient procedure. The patient underwent procedure without complications and are discharged home    Follow up/Patient Instructions:  Follow up as scheduled in Pain Management clinic in 3-4 weeks/Patient has received instructions and follow up date and time    Medications:  Continue previous medications    Discharge Procedure Orders   Call MD for:  temperature >100.4     Call MD for:  severe uncontrolled pain     Call MD for:  redness, tenderness, or signs of infection (pain, swelling, redness, odor or green/yellow discharge around incision site)     Call MD for:  severe persistent headache     No dressing needed         Discharge Procedure Orders (must include Diet, Follow-up, Activity):   Discharge Procedure Orders (must include Diet, Follow-up, Activity)   Call MD for:  temperature >100.4     Call MD for:  severe uncontrolled pain     Call MD for:  redness, tenderness, or signs of infection (pain, swelling, redness, odor or green/yellow discharge around incision site)     Call MD for:  severe persistent headache     No dressing needed      f

## 2018-08-22 NOTE — OP NOTE
PROCEDURE DATE: 8/22/2018    Procedure: C7-T1 cervical interlaminar epidural steroid injection under utilizing fluoroscopy.    Diagnosis: Cervical Radiculopathy    POSTOP DIAGNOSIS: SAME    Physician: Valerio Lynn MD    Medications injected:  Methylprednisone 80mg followed by a slow injection of 4 mL sterile, preservative-free normal saline.    Local anesthetic used: Lidocaine 1%, 4 ml.    Sedation Medications: 2mg versed    Complications:  none    Estimated blood loss: none    Technique:  A time-out was taken to identify patient and procedure prior to starting the procedure.  With the patient laying in a prone position with the neck in a mid-flexed forward position, the area was prepped and draped in the usual sterile fashion using ChloraPrep and a fenestrated drape.  The area was determined under AP fluoroscopic guidance.  Local anesthetic was given using a 25-gauge 1.5 inch needle by raising a wheal and then infiltrating ventrally.  A 3.5 inch 20-gauge Touhy needle was introduced under fluoroscopic guidance to meet the lamina of C7.  The needle was then hinged under the lamina then advanced using loss of resistance technique.  Once the tip of the needle was in the desired position, the contrast dye Omnipaque was injected to determine placement and no uptake.  The steroid was then injected slowly followed by a slow injection of 4 mL of the sterile preservative-free normal saline.  The patient tolerated the procedure well.    The patient was monitored after the procedure and was given post-procedure and discharge instructions to follow at home. The patient was discharged in a stable condition.

## 2018-09-05 ENCOUNTER — PATIENT MESSAGE (OUTPATIENT)
Dept: FAMILY MEDICINE | Facility: CLINIC | Age: 38
End: 2018-09-05

## 2018-09-21 ENCOUNTER — PATIENT MESSAGE (OUTPATIENT)
Dept: VASCULAR SURGERY | Facility: CLINIC | Age: 38
End: 2018-09-21

## 2018-09-25 ENCOUNTER — PATIENT MESSAGE (OUTPATIENT)
Dept: VASCULAR SURGERY | Facility: CLINIC | Age: 38
End: 2018-09-25

## 2018-09-26 ENCOUNTER — PATIENT MESSAGE (OUTPATIENT)
Dept: VASCULAR SURGERY | Facility: CLINIC | Age: 38
End: 2018-09-26

## 2018-10-02 ENCOUNTER — HOSPITAL ENCOUNTER (OUTPATIENT)
Dept: RADIOLOGY | Facility: HOSPITAL | Age: 38
Discharge: HOME | End: 2018-10-02
Attending: THORACIC SURGERY (CARDIOTHORACIC VASCULAR SURGERY)
Payer: COMMERCIAL

## 2018-10-02 DIAGNOSIS — R60.0 LOCALIZED EDEMA: ICD-10-CM

## 2018-10-02 PROCEDURE — 93970 EXTREMITY STUDY: CPT | Mod: 26,,, | Performed by: RADIOLOGY

## 2018-10-02 PROCEDURE — 93970 EXTREMITY STUDY: CPT | Mod: TC,PO

## 2018-10-11 ENCOUNTER — PATIENT MESSAGE (OUTPATIENT)
Dept: VASCULAR SURGERY | Facility: CLINIC | Age: 38
End: 2018-10-11

## 2018-10-17 ENCOUNTER — OFFICE VISIT (OUTPATIENT)
Dept: VASCULAR SURGERY | Facility: CLINIC | Age: 38
End: 2018-10-17
Payer: COMMERCIAL

## 2018-10-17 VITALS
SYSTOLIC BLOOD PRESSURE: 118 MMHG | HEIGHT: 72 IN | WEIGHT: 167.63 LBS | HEART RATE: 114 BPM | BODY MASS INDEX: 22.71 KG/M2 | DIASTOLIC BLOOD PRESSURE: 75 MMHG

## 2018-10-17 DIAGNOSIS — I87.2 VENOUS INSUFFICIENCY OF RIGHT LOWER EXTREMITY: Primary | ICD-10-CM

## 2018-10-17 DIAGNOSIS — I83.811 VARICOSE VEINS OF RIGHT LOWER EXTREMITY WITH PAIN: ICD-10-CM

## 2018-10-17 PROCEDURE — 99999 PR PBB SHADOW E&M-EST. PATIENT-LVL III: CPT | Mod: PBBFAC,,, | Performed by: THORACIC SURGERY (CARDIOTHORACIC VASCULAR SURGERY)

## 2018-10-17 PROCEDURE — 3008F BODY MASS INDEX DOCD: CPT | Mod: CPTII,S$GLB,, | Performed by: THORACIC SURGERY (CARDIOTHORACIC VASCULAR SURGERY)

## 2018-10-17 PROCEDURE — 99215 OFFICE O/P EST HI 40 MIN: CPT | Mod: S$GLB,,, | Performed by: THORACIC SURGERY (CARDIOTHORACIC VASCULAR SURGERY)

## 2018-10-17 RX ORDER — VORTIOXETINE 20 MG/1
TABLET, FILM COATED ORAL
COMMUNITY
Start: 2018-10-10 | End: 2018-11-27

## 2018-10-17 RX ORDER — FLUTICASONE FUROATE AND VILANTEROL TRIFENATATE 100; 25 UG/1; UG/1
POWDER RESPIRATORY (INHALATION)
COMMUNITY
Start: 2018-09-18 | End: 2021-03-08

## 2018-10-17 RX ORDER — ALBUTEROL SULFATE 90 UG/1
AEROSOL, METERED RESPIRATORY (INHALATION)
COMMUNITY
Start: 2018-09-18

## 2018-10-17 RX ORDER — DEXTROAMPHETAMINE SACCHARATE, AMPHETAMINE ASPARTATE, DEXTROAMPHETAMINE SULFATE AND AMPHETAMINE SULFATE 5; 5; 5; 5 MG/1; MG/1; MG/1; MG/1
TABLET ORAL
COMMUNITY
Start: 2018-09-19

## 2018-10-17 RX ORDER — PHENAZOPYRIDINE HYDROCHLORIDE 100 MG/1
TABLET, FILM COATED ORAL
COMMUNITY
Start: 2018-10-04 | End: 2018-10-17

## 2018-10-17 RX ORDER — LAMOTRIGINE 25 MG/1
TABLET ORAL
COMMUNITY
Start: 2018-10-13 | End: 2020-06-24

## 2018-10-17 NOTE — PROGRESS NOTES
OFFICE NOTE    HISTORY OF PRESENT ILLNESS:  The patient is a 38-year-old female with varicose   veins with pain of the right lower extremity.  She had undergone by bilateral   endovenous laser ablations over at the [facility] several years ago,   and in 2011 underwent phlebectomy by Dr. Maria Esther Bejarano on the left lower   extremity using 31 stab incisions.  She came to see me in 2016 because the pain   and edema of her left lower extremity recurred.  The left greater saphenous vein   had recanalized.  I re-ablated the left greater saphenous vein with the laser,   and ever since I did that, the patient states that she has had no further   problems with the left lower extremity.  She was doing fine with the right lower   extremity until several months ago when she started to experience pain and some   edema and varicosities have developed.  She has been wearing her compression   stockings and elevating her legs.    PAST MEDICAL HISTORY:  Varicose veins with pain and edema of bilateral lower   extremities, anxiety, depression, gout, disk disease of the cervical spine.    PAST SURGICAL HISTORY:  Endovenous laser ablation of bilateral greater saphenous   veins, phlebectomy of the left lower extremity, operation on the cervical spine   a few months ago.    ALLERGIES:  No known drug allergies.    MEDICATIONS:  Breo Ellipta, Adderall, Lamictal, ProAir HFA, Wellbutrin.    ALLERGIES:  Codeine, Dilaudid.    FAMILY HISTORY:  Lung cancer, lupus, dementia, osteoporosis, hypertension.    SOCIAL HISTORY:  She quit smoking cigarettes in 2015.  She drinks alcohol   occasionally.    REVIEW OF SYSTEMS:  She complains of varicose veins of the right lower extremity   with pain and edema.  All other systems are reviewed and are negative.    PHYSICAL EXAMINATION:  VITAL SIGNS:  Blood pressure is 118/75, heart rate is 100, respiratory rate is   16, weight 167 pounds.  GENERAL:  She is awake and alert, in no apparent distress.  HEENT:   Head is normocephalic.  Pupils are equal, round, reactive.  Sclerae are   anicteric.  NECK:  Supple.  Trachea midline.  No masses.  LUNGS:  Clear.  HEART:  Has a regular rate and rhythm.  ABDOMEN:  Soft and nontender.  No masses.  Bowel sounds are present.  EXTREMITIES:  She has varicose and reticular veins of the right lower extremity   with trace edema of bilateral lower extremities.  Bilateral feet are pink and   warm and well perfused.  NEUROLOGIC:  Awake, alert and oriented.  No lateralizing neurologic deficits.    Duplex scan of the veins of the lower extremities showed bilateral greater   saphenous vein to be absent consistent with endovenous laser ablation.  The   patient also has venous insufficiency of the right lesser saphenous vein with a   reflux time of 4083 milliseconds.    IMPRESSION:  1.  Varicose veins with pain and edema of the right lower extremity.  2.  Venous insufficiency of the right lesser saphenous vein.  3.  Status post endovenous laser ablation of bilateral greater saphenous veins.    RECOMMENDATIONS:  I think the patient would benefit from endovenous laser   treatment of the right lesser saphenous vein followed by Veinlite-guided   sclerotherapy.  In the meanwhile, she will continue to wear compression   stockings and elevate the legs as much as possible.      TANYA  dd: 10/17/2018 16:58:17 (CDT)  td: 10/18/2018 06:45:22 (CDT)  Doc ID   #[id number]  Job ID #[id number]    CC:

## 2018-10-24 ENCOUNTER — PATIENT MESSAGE (OUTPATIENT)
Dept: VASCULAR SURGERY | Facility: CLINIC | Age: 38
End: 2018-10-24

## 2018-10-25 ENCOUNTER — PATIENT MESSAGE (OUTPATIENT)
Dept: VASCULAR SURGERY | Facility: CLINIC | Age: 38
End: 2018-10-25

## 2018-10-26 ENCOUNTER — PATIENT MESSAGE (OUTPATIENT)
Dept: VASCULAR SURGERY | Facility: CLINIC | Age: 38
End: 2018-10-26

## 2018-10-26 DIAGNOSIS — I87.2 VENOUS INSUFFICIENCY: Primary | ICD-10-CM

## 2018-10-26 RX ORDER — LIDOCAINE HYDROCHLORIDE 10 MG/ML
1 INJECTION, SOLUTION EPIDURAL; INFILTRATION; INTRACAUDAL; PERINEURAL ONCE
Status: CANCELLED | OUTPATIENT
Start: 2018-10-26 | End: 2018-10-26

## 2018-11-04 ENCOUNTER — PATIENT MESSAGE (OUTPATIENT)
Dept: UROLOGY | Facility: CLINIC | Age: 38
End: 2018-11-04

## 2018-11-04 DIAGNOSIS — N30.10 IC (INTERSTITIAL CYSTITIS): Primary | ICD-10-CM

## 2018-11-05 RX ORDER — PHENAZOPYRIDINE HYDROCHLORIDE 100 MG/1
100 TABLET, FILM COATED ORAL 3 TIMES DAILY PRN
Qty: 90 TABLET | Refills: 2 | Status: SHIPPED | OUTPATIENT
Start: 2018-11-05 | End: 2018-11-15

## 2018-11-05 RX ORDER — SULFAMETHOXAZOLE AND TRIMETHOPRIM 800; 160 MG/1; MG/1
1 TABLET ORAL 2 TIMES DAILY
Qty: 20 TABLET | Refills: 1 | Status: SHIPPED | OUTPATIENT
Start: 2018-11-05 | End: 2021-03-08

## 2018-11-28 ENCOUNTER — HOSPITAL ENCOUNTER (OUTPATIENT)
Facility: HOSPITAL | Age: 38
Discharge: HOME | End: 2018-11-28
Attending: THORACIC SURGERY (CARDIOTHORACIC VASCULAR SURGERY) | Admitting: THORACIC SURGERY (CARDIOTHORACIC VASCULAR SURGERY)
Payer: COMMERCIAL

## 2018-11-28 VITALS
HEIGHT: 72 IN | SYSTOLIC BLOOD PRESSURE: 92 MMHG | OXYGEN SATURATION: 100 % | WEIGHT: 167.56 LBS | TEMPERATURE: 98 F | BODY MASS INDEX: 22.69 KG/M2 | HEART RATE: 74 BPM | DIASTOLIC BLOOD PRESSURE: 58 MMHG | RESPIRATION RATE: 12 BRPM

## 2018-11-28 DIAGNOSIS — I87.2 VENOUS INSUFFICIENCY: Primary | ICD-10-CM

## 2018-11-28 LAB
B-HCG UR QL: NEGATIVE
CTP QC/QA: YES

## 2018-11-28 PROCEDURE — 71000015 HC POSTOP RECOV 1ST HR: Mod: PO | Performed by: THORACIC SURGERY (CARDIOTHORACIC VASCULAR SURGERY)

## 2018-11-28 PROCEDURE — 36478 ENDOVENOUS LASER 1ST VEIN: CPT | Mod: RT,,, | Performed by: THORACIC SURGERY (CARDIOTHORACIC VASCULAR SURGERY)

## 2018-11-28 PROCEDURE — 81025 URINE PREGNANCY TEST: CPT | Mod: PO | Performed by: THORACIC SURGERY (CARDIOTHORACIC VASCULAR SURGERY)

## 2018-11-28 PROCEDURE — 25000003 PHARM REV CODE 250: Mod: PO | Performed by: THORACIC SURGERY (CARDIOTHORACIC VASCULAR SURGERY)

## 2018-11-28 PROCEDURE — 99152 MOD SED SAME PHYS/QHP 5/>YRS: CPT | Mod: ,,, | Performed by: THORACIC SURGERY (CARDIOTHORACIC VASCULAR SURGERY)

## 2018-11-28 PROCEDURE — 36000707: Mod: PO | Performed by: THORACIC SURGERY (CARDIOTHORACIC VASCULAR SURGERY)

## 2018-11-28 PROCEDURE — 36000706: Mod: PO | Performed by: THORACIC SURGERY (CARDIOTHORACIC VASCULAR SURGERY)

## 2018-11-28 PROCEDURE — 63600175 PHARM REV CODE 636 W HCPCS: Mod: PO | Performed by: THORACIC SURGERY (CARDIOTHORACIC VASCULAR SURGERY)

## 2018-11-28 RX ORDER — MIDAZOLAM HYDROCHLORIDE 1 MG/ML
INJECTION INTRAMUSCULAR; INTRAVENOUS
Status: DISCONTINUED | OUTPATIENT
Start: 2018-11-28 | End: 2018-11-28 | Stop reason: HOSPADM

## 2018-11-28 RX ORDER — LIDOCAINE HYDROCHLORIDE 10 MG/ML
INJECTION, SOLUTION EPIDURAL; INFILTRATION; INTRACAUDAL; PERINEURAL
Status: DISCONTINUED | OUTPATIENT
Start: 2018-11-28 | End: 2018-11-28 | Stop reason: HOSPADM

## 2018-11-28 RX ORDER — HYDROCODONE BITARTRATE AND ACETAMINOPHEN 5; 325 MG/1; MG/1
1 TABLET ORAL EVERY 4 HOURS PRN
Qty: 30 TABLET | Refills: 0 | Status: SHIPPED | OUTPATIENT
Start: 2018-11-28 | End: 2020-06-24

## 2018-11-28 RX ORDER — LIDOCAINE HYDROCHLORIDE AND EPINEPHRINE 10; 10 MG/ML; UG/ML
INJECTION, SOLUTION INFILTRATION; PERINEURAL
Status: DISCONTINUED | OUTPATIENT
Start: 2018-11-28 | End: 2018-11-28 | Stop reason: HOSPADM

## 2018-11-28 RX ORDER — SODIUM CHLORIDE, SODIUM LACTATE, POTASSIUM CHLORIDE, CALCIUM CHLORIDE 600; 310; 30; 20 MG/100ML; MG/100ML; MG/100ML; MG/100ML
INJECTION, SOLUTION INTRAVENOUS CONTINUOUS
Status: DISCONTINUED | OUTPATIENT
Start: 2018-11-28 | End: 2018-11-28 | Stop reason: HOSPADM

## 2018-11-28 RX ORDER — LIDOCAINE HYDROCHLORIDE 10 MG/ML
1 INJECTION, SOLUTION EPIDURAL; INFILTRATION; INTRACAUDAL; PERINEURAL ONCE
Status: DISCONTINUED | OUTPATIENT
Start: 2018-11-28 | End: 2018-11-28 | Stop reason: HOSPADM

## 2018-11-28 RX ORDER — SODIUM BICARBONATE 1 MEQ/ML
SYRINGE (ML) INTRAVENOUS
Status: DISCONTINUED | OUTPATIENT
Start: 2018-11-28 | End: 2018-11-28 | Stop reason: HOSPADM

## 2018-11-28 RX ORDER — FENTANYL CITRATE 50 UG/ML
INJECTION, SOLUTION INTRAMUSCULAR; INTRAVENOUS
Status: DISCONTINUED | OUTPATIENT
Start: 2018-11-28 | End: 2018-11-28 | Stop reason: HOSPADM

## 2018-11-28 RX ADMIN — SODIUM CHLORIDE, SODIUM LACTATE, POTASSIUM CHLORIDE, AND CALCIUM CHLORIDE: .6; .31; .03; .02 INJECTION, SOLUTION INTRAVENOUS at 07:11

## 2018-11-28 NOTE — DISCHARGE INSTRUCTIONS
WESTS VEIN ABLATION:     No driving or operating heavy equipment on the day of the procedure   Avoid alcohol or making important decisions for 24 hours.    Keep the operation site dry for two days and maintain your ace wrap. If it feels too tight please use your body as a guide and loosen wrap accordingly.   Sponge baths until dressing removed at 72 hours then you may shower.   begin wearing your compression hose during the day for the next 2 weeks the morning following 72 hour    Leave steri strips in place if present.  If steri-strips get wet, pat dry. If they fall off, do not worry.   Expect some discomfort for the first week after procedure ( pulling sensation along your thigh or calf is normal and will subside, some bruising can be expected   Regular diet   May take 400-600mg of Ibuprophen three times day to minimize inflammation if not allergic   Bruising is to be expected.   Keep leg elevated when not ambulating   Walking is encouraged avoid prolonged sitting or standing for the first week. Start on day one.   Resume home medications as prescribed.   Wear support hose starting the morning of day three after surgery as directed per Dr. Zamora   Avoid squatting, heavy lifting, or aerobic activity until follow up   Wear compression stockings if traveling longer then one hour in car or plane for next six months and stand/walk at least every hour and a half   Avoid blood thinning medications for four days after procedure if approved by prescribing doctor      DONT:   Do not shower or tub bathe until after dressing removed.   No driving for 24 hours or while taking narcotic pain medication.   DO NOT TAKE ADDITIONAL TYLENOL/ACETAMINOPHEN WHILE TAKING NARCOTIC PAIN MEDICINE THAT CONTAINS TYLENOL/ACETAMINOPHEN.    CALL PHYSICIAN FOR:   Redness, swelling, or bleeding from surgical site   Fever greater then 101...   Drainage from the incision site that appears infected.   Persistent pain not  relieved by pain medication.    RETURN APPOINTMENT: Call to schedule appointment in two weeks  FOR EMERGENCIES: Contact Dr. Ching at [phone]      Recovery After Procedural Sedation (Adult)  You have been given medicine by vein to make you sleep during your surgery. This may have included both a pain medicine and sleeping medicine. Most of the effects have worn off. But you may still have some drowsiness for the next 6 to 8 hours.  Home care  Follow these guidelines when you get home:  · For the next 8 hours, you should be watched by a responsible adult. This person should make sure your condition is not getting worse.  · Don't drink any alcohol for the next 24 hours.  · Don't drive, operate dangerous machinery, or make important business or personal decisions during the next 24 hours.  Note: Your healthcare provider may tell you not to take any medicine by mouth for pain or sleep in the next 4 hours. These medicines may react with the medicines you were given in the hospital. This could cause a much stronger response than usual.  Follow-up care  Follow up with your healthcare provider if you are not alert and back to your usual level of activity within 12 hours.  When to seek medical advice  Call your healthcare provider right away if any of these occur:  · Drowsiness gets worse  · Weakness or dizziness gets worse  · Repeated vomiting  · You can't be awakened   Date Last Reviewed: 10/18/2016  © 3781-5008 [employer]. [address] [place] [address], [place] [address]. All rights reserved. This information is not intended as a substitute for professional medical care. Always follow your healthcare professional's instructions.

## 2018-11-28 NOTE — OP NOTE
DATE OF PROCEDURE:  11/28/2018.    PREOPERATIVE DIAGNOSES:  1.  Venous insufficiency of the right lesser saphenous vein.  2.  Varicose veins with pain and edema of the right lower extremity.    POSTOPERATIVE DIAGNOSES:  1.  Venous insufficiency of the right lesser saphenous vein.  2.  Varicose veins with pain and edema of the right lower extremity.    OPERATION:  Endovenous laser ablation of the right lesser saphenous vein.    SURGEON:  Reji Ching M.D., F.A.C.S.    ASSISTANT:  LOVELY Lora    ANESTHESIA:  1.  Local tumescent anesthesia using a mixture of 50 mL of 1% lidocaine with   epinephrine and 500 mL of normal saline.  2.  Intravenous sedation using 2 mg of Versed and 50 mcg of fentanyl IV.  The   patient's level of consciousness was monitored by the registered nurse from   08:16 a.m. to 08:50 a.m.  Other monitors included blood pressure, pulse   oximetry, heart rate, and respiratory rate.    PROCEDURE IN DETAIL:  With the patient in the prone position on the operating   room table, the right lower extremity was prepped and draped in a sterile   fashion.  Intravenous sedation was given in incremental doses for a total as   described above and the patient was monitored appropriately as described.  Using   an 18-gauge access needle and ultrasound guidance, I gained access to the right   lesser saphenous vein and passed a guidewire up the vein.  A 45 cm long sheath   was passed over the guidewire and the guidewire and dilator were removed, and   the laser fiber was passed through the sheath.  The tip of the fiber was   positioned distal to the saphenopopliteal junction.  Local tumescent anesthesia   was infiltrated into the perisaphenous tissues and then the Dornier 940 nm laser   was activated in the pulse mode with the duration of each pulse set at 1.3   seconds per pulse and the laser was pulled back at approximately 1 mm per second   until the entire length of cannulated vein was treated and  ablated.    Compression dressing was applied to the lower extremity.  The patient tolerated   the procedure and left the Operating Room in satisfactory and stable condition.      TANYA  dd: 11/28/2018 09:03:34 (CST)  td: 11/28/2018 09:17:05 (CST)  Doc ID   #[id number]  Job ID #[id number]    CC:

## 2018-11-28 NOTE — PLAN OF CARE
Patient tolerating oral liquids without difficulty. No apparent s&s of distress noted at this time. States pain 4/10 to right leg but tolerable for discharge. Drtessing C,D,I. Discharge instructions reviewed with patient/family/friend with good verbal feedback received. Patient ready for discharge

## 2018-11-28 NOTE — H&P
HISTORY OF PRESENT ILLNESS:  The patient is a 38-year-old female with varicose   veins with pain of the right lower extremity.  She had undergone by bilateral   endovenous laser ablations over at the [facility] several years ago,   and in 2011 underwent phlebectomy by Dr. Maria Esther Bejarano on the left lower   extremity using 31 stab incisions.  She came to see me in 2016 because the pain   and edema of her left lower extremity recurred.  The left greater saphenous vein   had recanalized.  I re-ablated the left greater saphenous vein with the laser,   and ever since I did that, the patient states that she has had no further   problems with the left lower extremity.  She was doing fine with the right lower   extremity until several months ago when she started to experience pain and some   edema and varicosities have developed.  She has been wearing her compression   stockings and elevating her legs.     PAST MEDICAL HISTORY:  Varicose veins with pain and edema of bilateral lower   extremities, anxiety, depression, gout, disk disease of the cervical spine.     PAST SURGICAL HISTORY:  Endovenous laser ablation of bilateral greater saphenous   veins, phlebectomy of the left lower extremity, operation on the cervical spine   a few months ago.     ALLERGIES:  No known drug allergies.     MEDICATIONS:  Breo Ellipta, Adderall, Lamictal, ProAir HFA, Wellbutrin.     ALLERGIES:  Codeine, Dilaudid.     FAMILY HISTORY:  Lung cancer, lupus, dementia, osteoporosis, hypertension.     SOCIAL HISTORY:  She quit smoking cigarettes in 2015.  She drinks alcohol   occasionally.     REVIEW OF SYSTEMS:  She complains of varicose veins of the right lower extremity   with pain and edema.  All other systems are reviewed and are negative.     PHYSICAL EXAMINATION:  VITAL SIGNS:  Blood pressure is 118/75, heart rate is 100, respiratory rate is   16, weight 167 pounds.  GENERAL:  She is awake and alert, in no apparent distress.  HEENT:  Head  is normocephalic.  Pupils are equal, round, reactive.  Sclerae are   anicteric.  NECK:  Supple.  Trachea midline.  No masses.  LUNGS:  Clear.  HEART:  Has a regular rate and rhythm.  ABDOMEN:  Soft and nontender.  No masses.  Bowel sounds are present.  EXTREMITIES:  She has varicose and reticular veins of the right lower extremity   with trace edema of bilateral lower extremities.  Bilateral feet are pink and   warm and well perfused.  NEUROLOGIC:  Awake, alert and oriented.  No lateralizing neurologic deficits.     Duplex scan of the veins of the lower extremities showed bilateral greater   saphenous vein to be absent consistent with endovenous laser ablation.  The   patient also has venous insufficiency of the right lesser saphenous vein with a   reflux time of 4083 milliseconds.     IMPRESSION:  1.  Varicose veins with pain and edema of the right lower extremity.  2.  Venous insufficiency of the right lesser saphenous vein.  3.  Status post endovenous laser ablation of bilateral greater saphenous veins.     RECOMMENDATIONS:  We wiil proceed with EVLT of the right LSV today.

## 2018-11-28 NOTE — BRIEF OP NOTE
[facility]  Brief Operative Note    SUMMARY     Surgery Date: 11/28/2018     Surgeon(s) and Role:     * Nancy Ching MD - Primary    Assisting Surgeon: CHRISTO Quevedo    Pre-op Diagnosis:  Venous insufficiency [I87.2]    Post-op Diagnosis:  Post-Op Diagnosis Codes:     * Venous insufficiency [I87.2]    Procedure(s) (LRB):  ABLATION, VEIN, PERIPHERAL, PERCUTANEOUS, ENDOVENOUS, USING LASER Right Lesser Saphenous Vein (Right)    Anesthesia: RN IV Sedation    Description of Procedure: EVLT    Description of the findings of the procedure: Insufficient vein    Estimated Blood Loss: 2cc       Specimens:   Specimen (12h ago, onward)    None

## 2018-12-02 NOTE — DISCHARGE SUMMARY
[facility]  Discharge Note  Short Stay    Admit Date: 11/28/2018    Discharge Date and Time: 11/28/2018  9:47 AM     Attending Physician: ASIF Ching    Discharge Provider: Reji Ching    Diagnoses:  Active Hospital Problems    Diagnosis  POA    *Venous insufficiency [I87.2]  Yes      Resolved Hospital Problems   No resolved problems to display.       Discharged Condition: stable    Hospital Course: Patient was admitted for an outpatient procedure and tolerated the procedure well with no complications.    Final Diagnoses: Same as principal problem.    Disposition: Home or Self Care    Follow up/Patient Instructions:  2 weeks  Medications:  Reconciled Home Medications:      Medication List      START taking these medications    HYDROcodone-acetaminophen 5-325 mg per tablet  Commonly known as:  NORCO  Take 1 tablet by mouth every 4 (four) hours as needed.        CONTINUE taking these medications    BREO ELLIPTA 100-25 mcg/dose diskus inhaler  Generic drug:  fluticasone-vilanterol     dextroamphetamine-amphetamine 20 mg tablet  Commonly known as:  ADDERALL     PROAIR HFA 90 mcg/actuation inhaler  Generic drug:  albuterol     sulfacetamide sodium-sulfur 9-4.5 % Clsr  APPLY ON THE SKIN TWICE A DAY        ASK your doctor about these medications    buPROPion 150 MG TBSR 12 hr tablet  Commonly known as:  WELLBUTRIN SR  Take 1 tablet (150 mg total) by mouth 2 (two) times daily.     lamoTRIgine 25 MG tablet  Commonly known as:  LAMICTAL     sulfamethoxazole-trimethoprim 800-160mg 800-160 mg Tab  Commonly known as:  BACTRIM DS  Take 1 tablet by mouth 2 (two) times daily.          Discharge Procedure Orders   Diet Adult Regular      Remove dressing in 72 hours     Shower on day dressing removed (No bath)     Follow-up Information     Reji Ching MD In 2 weeks.    Specialties:  Cardiothoracic Surgery, Vascular Surgery, Cardiovascular Disease, Cardiology  Contact information:  [address] [place]  [address]  [phone]                   Discharge Procedure Orders (must include Diet, Follow-up, Activity):   Discharge Procedure Orders (must include Diet, Follow-up, Activity)   Diet Adult Regular      Remove dressing in 72 hours     Shower on day dressing removed (No bath)

## 2019-04-04 ENCOUNTER — HOSPITAL ENCOUNTER (OUTPATIENT)
Dept: RADIOLOGY | Facility: HOSPITAL | Age: 39
Discharge: HOME | End: 2019-04-04
Attending: NURSE PRACTITIONER
Payer: COMMERCIAL

## 2019-04-04 ENCOUNTER — OFFICE VISIT (OUTPATIENT)
Dept: ORTHOPEDICS | Facility: CLINIC | Age: 39
End: 2019-04-04
Payer: COMMERCIAL

## 2019-04-04 VITALS — HEIGHT: 72 IN | BODY MASS INDEX: 22.62 KG/M2 | WEIGHT: 167 LBS

## 2019-04-04 DIAGNOSIS — M65.311 TRIGGER FINGER OF RIGHT THUMB: Primary | ICD-10-CM

## 2019-04-04 DIAGNOSIS — M79.641 RIGHT HAND PAIN: ICD-10-CM

## 2019-04-04 DIAGNOSIS — M79.641 RIGHT HAND PAIN: Primary | ICD-10-CM

## 2019-04-04 PROCEDURE — 97760 ORTHOTIC MGMT&TRAING 1ST ENC: CPT | Mod: S$GLB,,, | Performed by: NURSE PRACTITIONER

## 2019-04-04 PROCEDURE — 99999 PR PBB SHADOW E&M-EST. PATIENT-LVL II: CPT | Mod: PBBFAC,,, | Performed by: NURSE PRACTITIONER

## 2019-04-04 PROCEDURE — 73130 X-RAY EXAM OF HAND: CPT | Mod: 26,RT,, | Performed by: RADIOLOGY

## 2019-04-04 PROCEDURE — 99214 OFFICE O/P EST MOD 30 MIN: CPT | Mod: 25,S$GLB,, | Performed by: NURSE PRACTITIONER

## 2019-04-04 PROCEDURE — 99214 PR OFFICE/OUTPT VISIT, EST, LEVL IV, 30-39 MIN: ICD-10-PCS | Mod: 25,S$GLB,, | Performed by: NURSE PRACTITIONER

## 2019-04-04 PROCEDURE — 73130 X-RAY EXAM OF HAND: CPT | Mod: TC,PO,RT

## 2019-04-04 PROCEDURE — 20600 SMALL JOINT ASPIRATION/INJECTION: R THUMB MCP: ICD-10-PCS | Mod: F5,S$GLB,, | Performed by: NURSE PRACTITIONER

## 2019-04-04 PROCEDURE — 73130 XR HAND COMPLETE 3 VIEW RIGHT: ICD-10-PCS | Mod: 26,RT,, | Performed by: RADIOLOGY

## 2019-04-04 PROCEDURE — 20600 DRAIN/INJ JOINT/BURSA W/O US: CPT | Mod: F5,S$GLB,, | Performed by: NURSE PRACTITIONER

## 2019-04-04 PROCEDURE — 99999 PR PBB SHADOW E&M-EST. PATIENT-LVL II: ICD-10-PCS | Mod: PBBFAC,,, | Performed by: NURSE PRACTITIONER

## 2019-04-04 PROCEDURE — 97760 PR ORTHOTIC MGMT&TRAINJ INITIAL ENC EA 15 MINS: ICD-10-PCS | Mod: S$GLB,,, | Performed by: NURSE PRACTITIONER

## 2019-04-04 RX ORDER — TRIAMCINOLONE ACETONIDE 40 MG/ML
40 INJECTION, SUSPENSION INTRA-ARTICULAR; INTRAMUSCULAR
Status: DISCONTINUED | OUTPATIENT
Start: 2019-04-04 | End: 2019-04-04 | Stop reason: HOSPADM

## 2019-04-04 RX ADMIN — TRIAMCINOLONE ACETONIDE 40 MG: 40 INJECTION, SUSPENSION INTRA-ARTICULAR; INTRAMUSCULAR at 11:04

## 2019-04-04 NOTE — PROCEDURES
Small Joint Aspiration/Injection: R thumb MCP  Date/Time: 4/4/2019 11:52 AM  Performed by: IVANA Cummings  Authorized by: IVANA Cummings     Consent Done?:  Yes (Verbal)  Indications:  Pain  Timeout: Prior to procedure the correct patient, procedure, and site was verified      Location:  Thumb  Site:  R thumb MCP  Prep: Patient was prepped and draped in usual sterile fashion    Ultrasonic Guidance for needle placement: No  Needle size:  25 G  Approach:  Volar  Medications:  40 mg triamcinolone acetonide 40 mg/mL  Patient tolerance:  Patient tolerated the procedure well with no immediate complications

## 2019-04-04 NOTE — PROGRESS NOTES
DATE: 4/4/2019  PATIENT: Nayeli Gore  REFERRING MD:   CHIEF COMPLAINT:   Chief Complaint   Patient presents with    Right Hand - Pain       HISTORY:  Nayeli Gore is a 39 y.o. female  who presents for initial evaluation of her right thumb pain and triggering.  She is right hand dominant.  Her pain rating today is 8/10 and began about 2 days ago.  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  She uses the computer a lot.  Her pain increases with movements of the thumb, she reports triggering and clicking.  She has not tried a brace.    PAST MEDICAL/SURGICAL HISTORY:  Past Medical History:   Diagnosis Date    Anxiety     Arthritis     Asthma     Depression     Encounter for blood transfusion 04/13/2006    after birth of first child    Fibrocystic breast     Gout     Varicose veins      Past Surgical History:   Procedure Laterality Date    ABLATION ENDOMETRIAL THERMAL - NOVASURE N/A 10/4/2017    [facility] OR    ABLATION, VEIN, PERIPHERAL, PERCUTANEOUS, ENDOVENOUS, USING LASER Right Lesser Saphenous Vein Right 11/28/2018    [facility]    BACK SURGERY      C6-C7    CERVICAL DISC SURGERY      c6-c7 disc replacement    [id number] N/A 10/4/2017    Per[facility] at [facility] OR    Injection-steroid-epidural-cervical N/A 8/22/2018    Per[facility]io [facility] at [facility] OR    LAPAROSCOPY-CORINNE FALOPE RING AP Bilateral 10/4/2017    Performed by [facility] at [facility] OR    LASER ABLATION      uterus    leg laser      for venous reflux    TUBAL LIGATION      VEIN LIGATION AND STRIPPING Left 2012    left leg       Current Medications:   Current Outpatient Medications:     BREO ELLIPTA 100-25 mcg/dose diskus inhaler, , Disp: , Rfl:     buPROPion (WELLBUTRIN SR) 150 MG TBSR 12 hr tablet, Take 1 tablet (150 mg total) by mouth 2 (two) times daily., Disp: 60 tablet, Rfl: 11     dextroamphetamine-amphetamine (ADDERALL) 20 mg tablet, , Disp: , Rfl:     HYDROcodone-acetaminophen (NORCO) 5-325 mg per tablet, Take 1 tablet by mouth every 4 (four) hours as needed., Disp: 30 tablet, Rfl: 0    lamoTRIgine (LAMICTAL) 25 MG tablet, , Disp: , Rfl:     PROAIR HFA 90 mcg/actuation inhaler, , Disp: , Rfl:     sulfacetamide sodium-sulfur 9-4.5 % Clsr, APPLY ON THE SKIN TWICE A DAY, Disp: , Rfl: 5    sulfamethoxazole-trimethoprim 800-160mg (BACTRIM DS) 800-160 mg Tab, Take 1 tablet by mouth 2 (two) times daily., Disp: 20 tablet, Rfl: 1    Family History: family history was reviewed and is noncontributory  Social History:   Social History     Socioeconomic History    Marital status:      Spouse name: Not on file    Number of children: Not on file    Years of education: Not on file    Highest education level: Not on file   Occupational History    Occupation:    Social Needs    Financial resource strain: Not on file    Food insecurity:     Worry: Not on file     Inability: Not on file    Transportation needs:     Medical: Not on file     Non-medical: Not on file   Tobacco Use    Smoking status: Former Smoker     Packs/day: 0.50     Years: 8.00     Pack years: 4.00     Last attempt to quit: 10/24/2015     Years since quitting: 3.4    Smokeless tobacco: Never Used   Substance and Sexual Activity    Alcohol use: Yes     Alcohol/week: 0.0 oz     Comment: very rare    Drug use: No    Sexual activity: Not Currently   Lifestyle    Physical activity:     Days per week: Not on file     Minutes per session: Not on file    Stress: Not on file   Relationships    Social connections:     Talks on phone: Not on file     Gets together: Not on file     Attends Protestant service: Not on file     Active member of club or organization: Not on file     Attends meetings of clubs or organizations: Not on file     Relationship status: Not on file   Other Topics Concern    Not on file   Social  "History Narrative    Not on file       ROS:  Constitution: Negative for chills, fever, and sweats. Negative for unexplained weight loss.  Eyes: no redness, no discharge  Ears: no ear pain or tinnitus  Cardiovascular: Negative for chest pain, irregular heartbeat, leg swelling and palpitations.   Respiratory: Negative for cough and shortness of breath.   Gastrointestinal: Negative for abdominal pain, nausea and vomiting.   Genitourinary: Negative for bladder incontinence and dysuria.   Neurological: Negative for numbness.   Psychiatric/Behavioral: Negative for behavior changes.   Endocrine: Negative for palpitations.   Hematologic/Lymphatic: Negative for bleeding disorders.  Skin: Negative for pruritis or rash.       PHYSICAL EXAM:  Ht 6' 2" (1.88 m)   Wt 75.8 kg (167 lb)   BMI 21.44 kg/m²   Healthy appearing 39 year old female in no acute distress.  Examination of the right thumb reveals:    No edema, effusion, ecchymosis or obvious deformity  tender to palpation over the A1 pulley and proximal phalanx  ROM full  Strength 5/5  No gross instability  Sensation intact  Skin warm, dry, intact      IMAGING:   X-ray obtained Right hand performed today personally reviewed with patient. Radiologist report as follows:   The visualized osseous structures about the right hand and thumb show normal mineralization and alignment without evidence of acute fracture, dislocation, or osseous destructive process. There is an 11 mm densely sclerotic focus present within the right thumb distal phalanx, most likely a bone island or other nonaggressive fibro-osseous lesion.  No soft tissue abnormalities.     ASSESSMENT:   Right thumb trigger finger vs CMP osteoarthritis    PLAN:  The nature of the diagnosis, using models and diagrams when appropriate, was explained to the patient in detail. Treatment option discussed included non-operative measures of custom orthotic, rest,  modification of activities, elevation of extremity, over the " counter pain/antiinflammatory relief, physica/occupational therapy, cortisone injection and medrol dose pack.  More aggressive treatment options include referal to Dr Leiva and hand specialist.  All questions answered and the patient wishes to proceed today with a cortisone injection and thumb modabber splint.  Right trigger finger cortisone injection performed today (see procedure documentation).  I have instructed to monitor injection site for signs and symptoms of inflammation/infection.  I have instructed to elevate and apply ice to hand this evening.  Mary Lozano and I, performed a custom orthotic /brace adjustment, fitting and training with the patient. The patient demonstrated understanding and proper care. This was performed for 15 minutes.  Follow up if no improvement or worsening of symptoms.

## 2020-06-24 ENCOUNTER — OFFICE VISIT (OUTPATIENT)
Dept: UROLOGY | Facility: CLINIC | Age: 40
End: 2020-06-24
Payer: COMMERCIAL

## 2020-06-24 VITALS — WEIGHT: 180.13 LBS | BODY MASS INDEX: 24.4 KG/M2 | HEIGHT: 72 IN

## 2020-06-24 DIAGNOSIS — N30.10 IC (INTERSTITIAL CYSTITIS): Primary | ICD-10-CM

## 2020-06-24 DIAGNOSIS — N30.00 ACUTE CYSTITIS WITHOUT HEMATURIA: ICD-10-CM

## 2020-06-24 LAB
BILIRUB SERPL-MCNC: NORMAL MG/DL
BLOOD URINE, POC: NORMAL
CLARITY, POC UA: CLEAR
COLOR, POC UA: YELLOW
GLUCOSE UR QL STRIP: NORMAL
KETONES UR QL STRIP: NORMAL
LEUKOCYTE ESTERASE URINE, POC: NORMAL
NITRITE, POC UA: NORMAL
PH, POC UA: 6.5
PROTEIN, POC: NORMAL
SPECIFIC GRAVITY, POC UA: 1.01
UROBILINOGEN, POC UA: NORMAL

## 2020-06-24 PROCEDURE — 3008F PR BODY MASS INDEX (BMI) DOCUMENTED: ICD-10-PCS | Mod: CPTII,S$GLB,, | Performed by: UROLOGY

## 2020-06-24 PROCEDURE — 99999 PR PBB SHADOW E&M-EST. PATIENT-LVL III: CPT | Mod: PBBFAC,,, | Performed by: UROLOGY

## 2020-06-24 PROCEDURE — 99205 OFFICE O/P NEW HI 60 MIN: CPT | Mod: 25,S$GLB,, | Performed by: UROLOGY

## 2020-06-24 PROCEDURE — 81002 POCT URINE DIPSTICK WITHOUT MICROSCOPE: ICD-10-PCS | Mod: S$GLB,,, | Performed by: UROLOGY

## 2020-06-24 PROCEDURE — 81002 URINALYSIS NONAUTO W/O SCOPE: CPT | Mod: S$GLB,,, | Performed by: UROLOGY

## 2020-06-24 PROCEDURE — 3008F BODY MASS INDEX DOCD: CPT | Mod: CPTII,S$GLB,, | Performed by: UROLOGY

## 2020-06-24 PROCEDURE — 99999 PR PBB SHADOW E&M-EST. PATIENT-LVL III: ICD-10-PCS | Mod: PBBFAC,,, | Performed by: UROLOGY

## 2020-06-24 PROCEDURE — 99205 PR OFFICE/OUTPT VISIT, NEW, LEVL V, 60-74 MIN: ICD-10-PCS | Mod: 25,S$GLB,, | Performed by: UROLOGY

## 2020-06-24 RX ORDER — CYCLOBENZAPRINE HCL 10 MG
TABLET ORAL
COMMUNITY
Start: 2020-06-09 | End: 2021-03-08

## 2020-06-24 RX ORDER — AZITHROMYCIN 500 MG/1
TABLET, FILM COATED ORAL
COMMUNITY
Start: 2020-04-01 | End: 2021-03-08

## 2020-06-24 RX ORDER — INDOMETHACIN 25 MG/1
CAPSULE ORAL
COMMUNITY
Start: 2020-04-07 | End: 2020-06-24

## 2020-06-24 RX ORDER — METHYLPREDNISOLONE 4 MG/1
TABLET ORAL
COMMUNITY
Start: 2020-06-09 | End: 2020-06-24

## 2020-06-24 RX ORDER — SULFAMETHOXAZOLE AND TRIMETHOPRIM 800; 160 MG/1; MG/1
1 TABLET ORAL 2 TIMES DAILY
Qty: 30 TABLET | Refills: 0 | Status: SHIPPED | OUTPATIENT
Start: 2020-06-24 | End: 2021-03-08

## 2020-06-24 RX ORDER — CLONAZEPAM 0.5 MG/1
TABLET ORAL
COMMUNITY
Start: 2020-06-11 | End: 2021-03-08

## 2020-06-24 RX ORDER — FLUTICASONE PROPIONATE 50 MCG
2 SPRAY, SUSPENSION (ML) NASAL DAILY
COMMUNITY
Start: 2020-04-05

## 2020-06-24 RX ORDER — MELOXICAM 15 MG/1
TABLET ORAL
COMMUNITY
End: 2021-03-08

## 2020-06-24 RX ORDER — BUPROPION HYDROCHLORIDE 200 MG/1
TABLET, EXTENDED RELEASE ORAL
COMMUNITY
Start: 2020-04-13 | End: 2021-03-08

## 2020-06-24 NOTE — LETTER
June 26, 2020      Rik Navarro MD  [address] [facility] [address]           [place] [facility]  [address] [place] [address]  Phone: [phone]  Fax: [phone]          Patient: Nayeli Gore   MR Number: [id number]   YOB: 1980   Date of Visit: 6/24/2020       Dear Dr. Rik Navarro:    Thank you for referring Nayeli Gore to me for evaluation. Attached you will find relevant portions of my assessment and plan of care.    If you have questions, please do not hesitate to call me. I look forward to following Nayeli Gore along with you.    Sincerely,    Aris Sanches MD    Enclosure  CC:  No Recipients    If you would like to receive this communication electronically, please contact [email or web address] or [phone] to request more information on [employer] Link access.    For providers and/or their staff who would like to refer a patient to [facility], please contact us through our one-stop-shop provider referral line, [facility] [place], at [phone].    If you feel you have received this communication in error or would no longer like to receive these types of communications, please e-mail [email or web address]

## 2020-06-24 NOTE — PROGRESS NOTES
UROLOGY [place]  6 24 20    Cc interstitial cystitis    Age 40, has a long history of interstitial cystitis.  Also has history of recurrent urinary tract infections.  Currently having some discomfort at the end of the voiding stream.  When the bladder is empty, she generally feels well.    She recently took Bactrim for a UTI.  She has improved from that.  Has nocturia 3-4 times and daytime voiding frequency is q.1 hour.    In May 2017 had a cystoscopy by us without abnormalities.    PAST MEDICAL HISTORY:    SURGICAL:  Vein ligation and stripping in lower extremities.  MEDICAL:  Varicose veins, gout, depression and anxiety.  FAMILIAL:  Father had hypertension.  Mother had osteoporosis and recurrent UTIs.    The patient has a brother with nephrolithiasis.  SOCIAL:  The patient is a , , lives in [place].     ALLERGIES:  Hydrocodone, which caused her itching.     PHYSICAL EXAMINATION:  ABDOMEN:  Flat.  No masses.  CVA is negative.  No organomegaly or tenderness.      Recurrent uti's  Chronic bladder irritation    we discussed possible use of preventive antibiotics, and we discussed various ways to use them for that purpose. In the past we put her on a patient-initiated program of episodic antibiotic use, where pt would take bactrim bid x 3 days at the first sign of a uti.     In addition to the recurrent uti's, pt appears to have a chronic state of bladder irritation, and this is most likely interstitial cystitis. (Has low volume voidings, chronic bladder pain, partial and temporary relief of the bladder pain after voiding).  I discussed approaches to this chronic condition, and I suggest treatment with elmiron 100 mg po bid, but it is very expensive.     Can also start using prelief before every meal.  Can also use pyridium 20 tid with a meal as needed for dysuria  Can also use uribel as needed.  Eventually we can use vaginal suppositories with lidocaine 20 mg, baclofen 6 mg and diazepam 5 mg  (compounded)  RTC 6 mo      Counseled 55 min  Over 50% of time in counseling

## 2020-06-30 ENCOUNTER — PATIENT MESSAGE (OUTPATIENT)
Dept: UROLOGY | Facility: CLINIC | Age: 40
End: 2020-06-30

## 2021-03-11 ENCOUNTER — HOSPITAL ENCOUNTER (OUTPATIENT)
Dept: RADIOLOGY | Facility: HOSPITAL | Age: 41
Discharge: HOME | End: 2021-03-11
Attending: OBSTETRICS & GYNECOLOGY
Payer: MEDICAID

## 2021-03-11 ENCOUNTER — OFFICE VISIT (OUTPATIENT)
Dept: OBSTETRICS AND GYNECOLOGY | Facility: CLINIC | Age: 41
End: 2021-03-11
Payer: MEDICAID

## 2021-03-11 VITALS
BODY MASS INDEX: 24.91 KG/M2 | SYSTOLIC BLOOD PRESSURE: 116 MMHG | WEIGHT: 183.88 LBS | DIASTOLIC BLOOD PRESSURE: 82 MMHG | HEIGHT: 72 IN

## 2021-03-11 DIAGNOSIS — N95.1 PERIMENOPAUSAL: ICD-10-CM

## 2021-03-11 DIAGNOSIS — Z12.31 VISIT FOR SCREENING MAMMOGRAM: ICD-10-CM

## 2021-03-11 DIAGNOSIS — Z12.4 CERVICAL CANCER SCREENING: ICD-10-CM

## 2021-03-11 DIAGNOSIS — Z13.21 SCREENING FOR ENDOCRINE, NUTRITIONAL, METABOLIC AND IMMUNITY DISORDER: ICD-10-CM

## 2021-03-11 DIAGNOSIS — Z13.29 SCREENING FOR ENDOCRINE, NUTRITIONAL, METABOLIC AND IMMUNITY DISORDER: ICD-10-CM

## 2021-03-11 DIAGNOSIS — Z13.228 SCREENING FOR ENDOCRINE, NUTRITIONAL, METABOLIC AND IMMUNITY DISORDER: ICD-10-CM

## 2021-03-11 DIAGNOSIS — Z13.0 SCREENING FOR ENDOCRINE, NUTRITIONAL, METABOLIC AND IMMUNITY DISORDER: ICD-10-CM

## 2021-03-11 DIAGNOSIS — Z01.419 ENCOUNTER FOR GYNECOLOGICAL EXAMINATION (GENERAL) (ROUTINE) WITHOUT ABNORMAL FINDINGS: Primary | ICD-10-CM

## 2021-03-11 PROCEDURE — 99213 OFFICE O/P EST LOW 20 MIN: CPT | Mod: PBBFAC,PN | Performed by: OBSTETRICS & GYNECOLOGY

## 2021-03-11 PROCEDURE — 77063 BREAST TOMOSYNTHESIS BI: CPT | Mod: 26,,, | Performed by: RADIOLOGY

## 2021-03-11 PROCEDURE — 88175 CYTOPATH C/V AUTO FLUID REDO: CPT | Performed by: OBSTETRICS & GYNECOLOGY

## 2021-03-11 PROCEDURE — 99386 PR PREVENTIVE VISIT,NEW,40-64: ICD-10-PCS | Mod: 25,S$PBB,, | Performed by: OBSTETRICS & GYNECOLOGY

## 2021-03-11 PROCEDURE — 87624 HPV HI-RISK TYP POOLED RSLT: CPT | Performed by: OBSTETRICS & GYNECOLOGY

## 2021-03-11 PROCEDURE — 77063 MAMMO DIGITAL SCREENING BILAT WITH TOMO: ICD-10-PCS | Mod: 26,,, | Performed by: RADIOLOGY

## 2021-03-11 PROCEDURE — 77067 SCR MAMMO BI INCL CAD: CPT | Mod: TC,PN

## 2021-03-11 PROCEDURE — 99999 PR PBB SHADOW E&M-EST. PATIENT-LVL III: ICD-10-PCS | Mod: PBBFAC,,, | Performed by: OBSTETRICS & GYNECOLOGY

## 2021-03-11 PROCEDURE — 99999 PR PBB SHADOW E&M-EST. PATIENT-LVL III: CPT | Mod: PBBFAC,,, | Performed by: OBSTETRICS & GYNECOLOGY

## 2021-03-11 PROCEDURE — 77067 SCR MAMMO BI INCL CAD: CPT | Mod: 26,,, | Performed by: RADIOLOGY

## 2021-03-11 PROCEDURE — 99386 PREV VISIT NEW AGE 40-64: CPT | Mod: 25,S$PBB,, | Performed by: OBSTETRICS & GYNECOLOGY

## 2021-03-11 PROCEDURE — 77067 MAMMO DIGITAL SCREENING BILAT WITH TOMO: ICD-10-PCS | Mod: 26,,, | Performed by: RADIOLOGY

## 2021-03-11 RX ORDER — GABAPENTIN 300 MG/1
CAPSULE ORAL
Qty: 90 CAPSULE | Refills: 6 | Status: SHIPPED | OUTPATIENT
Start: 2021-03-11

## 2021-03-12 DIAGNOSIS — R92.8 ABNORMAL MAMMOGRAPHY: Primary | ICD-10-CM

## 2021-03-13 ENCOUNTER — LAB VISIT (OUTPATIENT)
Dept: LAB | Facility: HOSPITAL | Age: 41
End: 2021-03-13
Attending: OBSTETRICS & GYNECOLOGY
Payer: MEDICAID

## 2021-03-13 DIAGNOSIS — Z13.21 SCREENING FOR ENDOCRINE, NUTRITIONAL, METABOLIC AND IMMUNITY DISORDER: ICD-10-CM

## 2021-03-13 DIAGNOSIS — Z13.0 SCREENING FOR ENDOCRINE, NUTRITIONAL, METABOLIC AND IMMUNITY DISORDER: ICD-10-CM

## 2021-03-13 DIAGNOSIS — Z13.29 SCREENING FOR ENDOCRINE, NUTRITIONAL, METABOLIC AND IMMUNITY DISORDER: ICD-10-CM

## 2021-03-13 DIAGNOSIS — R07.1 CHEST PAIN ON BREATHING: ICD-10-CM

## 2021-03-13 DIAGNOSIS — Z13.228 SCREENING FOR ENDOCRINE, NUTRITIONAL, METABOLIC AND IMMUNITY DISORDER: ICD-10-CM

## 2021-03-13 LAB
BASOPHILS # BLD AUTO: 0.03 K/UL (ref 0–0.2)
BASOPHILS NFR BLD: 0.6 % (ref 0–1.9)
CHOLEST SERPL-MCNC: 173 MG/DL (ref 120–199)
CHOLEST/HDLC SERPL: 2.6 {RATIO} (ref 2–5)
DIFFERENTIAL METHOD: NORMAL
EOSINOPHIL # BLD AUTO: 0.2 K/UL (ref 0–0.5)
EOSINOPHIL NFR BLD: 3.6 % (ref 0–8)
ERYTHROCYTE [DISTWIDTH] IN BLOOD BY AUTOMATED COUNT: 13.3 % (ref 11.5–14.5)
ESTIMATED AVG GLUCOSE: 97 MG/DL (ref 68–131)
GLUCOSE SERPL-MCNC: 88 MG/DL (ref 70–110)
HBA1C MFR BLD: 5 % (ref 4–5.6)
HCT VFR BLD AUTO: 39.8 % (ref 37–48.5)
HDLC SERPL-MCNC: 66 MG/DL (ref 40–75)
HDLC SERPL: 38.2 % (ref 20–50)
HGB BLD-MCNC: 12.8 G/DL (ref 12–16)
IMM GRANULOCYTES # BLD AUTO: 0 K/UL (ref 0–0.04)
IMM GRANULOCYTES NFR BLD AUTO: 0 % (ref 0–0.5)
LDLC SERPL CALC-MCNC: 99.8 MG/DL (ref 63–159)
LYMPHOCYTES # BLD AUTO: 2 K/UL (ref 1–4.8)
LYMPHOCYTES NFR BLD: 42.5 % (ref 18–48)
MCH RBC QN AUTO: 31 PG (ref 27–31)
MCHC RBC AUTO-ENTMCNC: 32.2 G/DL (ref 32–36)
MCV RBC AUTO: 96 FL (ref 82–98)
MONOCYTES # BLD AUTO: 0.4 K/UL (ref 0.3–1)
MONOCYTES NFR BLD: 7.4 % (ref 4–15)
NEUTROPHILS # BLD AUTO: 2.2 K/UL (ref 1.8–7.7)
NEUTROPHILS NFR BLD: 45.9 % (ref 38–73)
NONHDLC SERPL-MCNC: 107 MG/DL
NRBC BLD-RTO: 0 /100 WBC
PLATELET # BLD AUTO: 196 K/UL (ref 150–350)
PMV BLD AUTO: 9.6 FL (ref 9.2–12.9)
RBC # BLD AUTO: 4.13 M/UL (ref 4–5.4)
TRIGL SERPL-MCNC: 36 MG/DL (ref 30–150)
TSH SERPL DL<=0.005 MIU/L-ACNC: 0.93 UIU/ML (ref 0.4–4)
WBC # BLD AUTO: 4.71 K/UL (ref 3.9–12.7)

## 2021-03-13 PROCEDURE — 80061 LIPID PANEL: CPT | Performed by: OBSTETRICS & GYNECOLOGY

## 2021-03-13 PROCEDURE — 85025 COMPLETE CBC W/AUTO DIFF WBC: CPT | Performed by: OBSTETRICS & GYNECOLOGY

## 2021-03-13 PROCEDURE — 36415 COLL VENOUS BLD VENIPUNCTURE: CPT | Mod: PO | Performed by: INTERNAL MEDICINE

## 2021-03-13 PROCEDURE — 85379 FIBRIN DEGRADATION QUANT: CPT | Performed by: INTERNAL MEDICINE

## 2021-03-13 PROCEDURE — 84443 ASSAY THYROID STIM HORMONE: CPT | Performed by: OBSTETRICS & GYNECOLOGY

## 2021-03-13 PROCEDURE — 83036 HEMOGLOBIN GLYCOSYLATED A1C: CPT | Performed by: OBSTETRICS & GYNECOLOGY

## 2021-03-13 PROCEDURE — 82947 ASSAY GLUCOSE BLOOD QUANT: CPT | Performed by: OBSTETRICS & GYNECOLOGY

## 2021-03-15 ENCOUNTER — PATIENT MESSAGE (OUTPATIENT)
Dept: OBSTETRICS AND GYNECOLOGY | Facility: CLINIC | Age: 41
End: 2021-03-15

## 2021-03-15 DIAGNOSIS — D50.0 IRON DEFICIENCY ANEMIA DUE TO CHRONIC BLOOD LOSS: Primary | ICD-10-CM

## 2021-03-15 LAB — D DIMER PPP IA.FEU-MCNC: 0.29 MG/L FEU

## 2021-03-15 RX ORDER — FERROUS SULFATE 325(65) MG
325 TABLET, DELAYED RELEASE (ENTERIC COATED) ORAL DAILY
Qty: 60 TABLET | Refills: 3 | Status: SHIPPED | OUTPATIENT
Start: 2021-03-15 | End: 2022-03-15

## 2021-03-16 ENCOUNTER — TELEPHONE (OUTPATIENT)
Dept: RADIOLOGY | Facility: HOSPITAL | Age: 41
End: 2021-03-16

## 2021-03-20 LAB
CLINICAL INFO: NORMAL
CYTO CVX: NORMAL
CYTOLOGIST CVX/VAG CYTO: NORMAL
CYTOLOGY CMNT CVX/VAG CYTO-IMP: NORMAL
CYTOLOGY PAP THIN PREP EXPLANATION: NORMAL
DATE OF PREVIOUS PAP: NORMAL
DATE PREVIOUS BX: NO
HPV I/H RISK 4 DNA CVX QL NAA+PROBE: NOT DETECTED
LMP START DATE: NORMAL
SPECIMEN SOURCE CVX/VAG CYTO: NORMAL
STAT OF ADQ CVX/VAG CYTO-IMP: NORMAL

## 2021-03-26 ENCOUNTER — HOSPITAL ENCOUNTER (OUTPATIENT)
Dept: RADIOLOGY | Facility: HOSPITAL | Age: 41
Discharge: HOME | End: 2021-03-26
Attending: OBSTETRICS & GYNECOLOGY
Payer: MEDICAID

## 2021-03-26 DIAGNOSIS — R92.8 ABNORMAL MAMMOGRAPHY: ICD-10-CM

## 2021-03-26 PROCEDURE — 77066 DX MAMMO INCL CAD BI: CPT | Mod: TC,PO

## 2021-03-26 PROCEDURE — 76642 ULTRASOUND BREAST LIMITED: CPT | Mod: TC,50,PO

## 2021-03-26 PROCEDURE — 77066 MAMMO DIGITAL DIAGNOSTIC BILAT WITH TOMO: ICD-10-PCS | Mod: 26,,, | Performed by: RADIOLOGY

## 2021-03-26 PROCEDURE — 77062 BREAST TOMOSYNTHESIS BI: CPT | Mod: 26,,, | Performed by: RADIOLOGY

## 2021-03-26 PROCEDURE — 77066 DX MAMMO INCL CAD BI: CPT | Mod: 26,,, | Performed by: RADIOLOGY

## 2021-03-26 PROCEDURE — 76642 US BREAST BILATERAL LIMITED: ICD-10-PCS | Mod: 26,50,, | Performed by: RADIOLOGY

## 2021-03-26 PROCEDURE — 76642 ULTRASOUND BREAST LIMITED: CPT | Mod: 26,50,, | Performed by: RADIOLOGY

## 2021-03-26 PROCEDURE — 77062 MAMMO DIGITAL DIAGNOSTIC BILAT WITH TOMO: ICD-10-PCS | Mod: 26,,, | Performed by: RADIOLOGY

## 2021-04-20 ENCOUNTER — TELEPHONE (OUTPATIENT)
Dept: VASCULAR SURGERY | Facility: CLINIC | Age: 41
End: 2021-04-20

## 2021-09-08 ENCOUNTER — PATIENT MESSAGE (OUTPATIENT)
Dept: OBSTETRICS AND GYNECOLOGY | Facility: CLINIC | Age: 41
End: 2021-09-08

## 2021-09-09 ENCOUNTER — PATIENT MESSAGE (OUTPATIENT)
Dept: UROLOGY | Facility: CLINIC | Age: 41
End: 2021-09-09

## 2021-09-09 DIAGNOSIS — N39.0 RECURRENT UTI: Primary | ICD-10-CM

## 2021-09-09 RX ORDER — BUPROPION HYDROCHLORIDE 150 MG/1
150 TABLET, EXTENDED RELEASE ORAL 2 TIMES DAILY
COMMUNITY
Start: 2021-06-08

## 2021-09-09 RX ORDER — DOCUSATE SODIUM 100 MG
CAPSULE ORAL DAILY
COMMUNITY
Start: 2021-06-29

## 2021-09-09 RX ORDER — METHYLPHENIDATE HYDROCHLORIDE 40 MG/1
CAPSULE, EXTENDED RELEASE ORAL
COMMUNITY
Start: 2021-03-15

## 2021-09-09 RX ORDER — CYCLOBENZAPRINE HCL 10 MG
10 TABLET ORAL EVERY 8 HOURS PRN
COMMUNITY
Start: 2021-06-29

## 2021-09-09 RX ORDER — MONTELUKAST SODIUM 10 MG/1
10 TABLET ORAL DAILY
COMMUNITY
Start: 2021-06-19

## 2021-09-09 RX ORDER — HYDROCODONE BITARTRATE AND ACETAMINOPHEN 10; 325 MG/1; MG/1
1 TABLET ORAL EVERY 4 HOURS PRN
COMMUNITY
Start: 2021-06-29

## 2021-09-13 RX ORDER — PENTOSAN POLYSULFATE SODIUM 100 MG/1
100 CAPSULE, GELATIN COATED ORAL 3 TIMES DAILY
Qty: 90 CAPSULE | Refills: 11 | Status: SHIPPED | OUTPATIENT
Start: 2021-09-13 | End: 2022-09-13

## 2021-09-17 RX ORDER — SULFAMETHOXAZOLE AND TRIMETHOPRIM 800; 160 MG/1; MG/1
1 TABLET ORAL 2 TIMES DAILY
Qty: 30 TABLET | Refills: 2 | Status: SHIPPED | OUTPATIENT
Start: 2021-09-17 | End: 2021-10-02

## (undated) DEVICE — TRAY NERVE BLOCK

## (undated) DEVICE — SPONGE DERMACEA GAUZE 4X4

## (undated) DEVICE — GLOVE SURGICAL LATEX SZ 7

## (undated) DEVICE — UNDERGLOVES BIOGEL PI SZ 7 LF

## (undated) DEVICE — PAD ABD 8X10 STERILE

## (undated) DEVICE — UNDERGLOVE BIOGEL PI SZ 6.5 LF

## (undated) DEVICE — LASER FIBER 600 MICRON

## (undated) DEVICE — SEE MEDLINE ITEM 152530

## (undated) DEVICE — SEE MEDLINE ITEM 146271

## (undated) DEVICE — MARKER SKIN STND TIP BLUE BARR

## (undated) DEVICE — APPLICATOR CHLORAPREP ORN 26ML

## (undated) DEVICE — NDL TUOHY EPIDURAL 20G X 3.5

## (undated) DEVICE — SYR GLASS 5CC LUER LOK

## (undated) DEVICE — SEE MEDLINE ITEM 146373

## (undated) DEVICE — APPLICATOR CHLORAPREP CLR 10.5

## (undated) DEVICE — PACK ENDOVENOUS CUSTOM

## (undated) DEVICE — SEE MEDLINE ITEM 146362

## (undated) DEVICE — TUBING INFILTRATION SNG SPIKE